# Patient Record
Sex: FEMALE | Race: WHITE | NOT HISPANIC OR LATINO | Employment: UNEMPLOYED | URBAN - METROPOLITAN AREA
[De-identification: names, ages, dates, MRNs, and addresses within clinical notes are randomized per-mention and may not be internally consistent; named-entity substitution may affect disease eponyms.]

---

## 2017-02-06 ENCOUNTER — OFFICE VISIT (OUTPATIENT)
Dept: FAMILY MEDICINE CLINIC | Facility: CLINIC | Age: 32
End: 2017-02-06

## 2017-02-06 VITALS
DIASTOLIC BLOOD PRESSURE: 70 MMHG | SYSTOLIC BLOOD PRESSURE: 130 MMHG | BODY MASS INDEX: 39.55 KG/M2 | WEIGHT: 252 LBS | HEIGHT: 67 IN | HEART RATE: 101 BPM | OXYGEN SATURATION: 99 %

## 2017-02-06 DIAGNOSIS — Z72.0 TOBACCO ABUSE: ICD-10-CM

## 2017-02-06 DIAGNOSIS — Z79.899 HIGH RISK MEDICATION USE: ICD-10-CM

## 2017-02-06 DIAGNOSIS — R05.9 COUGH: Primary | ICD-10-CM

## 2017-02-06 DIAGNOSIS — H91.93 HEARING DIFFICULTY, BILATERAL: ICD-10-CM

## 2017-02-06 DIAGNOSIS — R06.83 SNORING: ICD-10-CM

## 2017-02-06 DIAGNOSIS — J18.9 COMMUNITY ACQUIRED PNEUMONIA: ICD-10-CM

## 2017-02-06 DIAGNOSIS — J45.20 MILD INTERMITTENT ASTHMA WITHOUT COMPLICATION: ICD-10-CM

## 2017-02-06 DIAGNOSIS — H54.7 VISION PROBLEMS: ICD-10-CM

## 2017-02-06 PROBLEM — F25.9 SCHIZOAFFECTIVE DISORDER (HCC): Status: ACTIVE | Noted: 2017-02-06

## 2017-02-06 PROBLEM — J45.909 ASTHMA: Status: ACTIVE | Noted: 2017-02-06

## 2017-02-06 PROBLEM — H91.90 HEARING DIFFICULTY: Status: ACTIVE | Noted: 2017-02-06

## 2017-02-06 PROBLEM — F31.9 BIPOLAR 1 DISORDER (HCC): Status: ACTIVE | Noted: 2017-02-06

## 2017-02-06 PROCEDURE — 99204 OFFICE O/P NEW MOD 45 MIN: CPT | Performed by: NURSE PRACTITIONER

## 2017-02-06 RX ORDER — LORATADINE 10 MG/1
10 TABLET ORAL
COMMUNITY
End: 2022-07-12 | Stop reason: SDUPTHER

## 2017-02-06 RX ORDER — DIVALPROEX SODIUM 125 MG/1
500 TABLET, DELAYED RELEASE ORAL 2 TIMES DAILY
COMMUNITY
End: 2022-07-12 | Stop reason: SDUPTHER

## 2017-02-06 RX ORDER — EPINEPHRINE 0.3 MG/.3ML
0.3 INJECTION SUBCUTANEOUS
COMMUNITY
Start: 2017-01-22

## 2017-02-06 RX ORDER — DIPHENHYDRAMINE HCL 25 MG
25 CAPSULE ORAL EVERY 6 HOURS
COMMUNITY
Start: 2017-01-22

## 2017-02-06 RX ORDER — BUDESONIDE AND FORMOTEROL FUMARATE DIHYDRATE 160; 4.5 UG/1; UG/1
2 AEROSOL RESPIRATORY (INHALATION)
COMMUNITY
End: 2017-02-06

## 2017-02-06 RX ORDER — AZITHROMYCIN 250 MG/1
TABLET, FILM COATED ORAL
Qty: 6 TABLET | Refills: 0 | Status: SHIPPED | OUTPATIENT
Start: 2017-02-06 | End: 2017-03-13

## 2017-02-06 RX ORDER — TRAZODONE HYDROCHLORIDE 50 MG/1
100 TABLET ORAL
COMMUNITY
End: 2022-07-12 | Stop reason: SDUPTHER

## 2017-02-06 NOTE — PATIENT INSTRUCTIONS
Follow-up chest x-ray within the next week or so  Make sure you call back a couple days later for results very important      Outpatient lab results okay, but make sure you receive results  For instructions from me    Follow-up in 4 months sooner for problems    If uncontrolled asthma, return office ASAP  May need further evaluation      For cough congestion Zithromax okay to start tomorrow    But if chest pain shortness of breThank You,      James Epley, [facility]    Recheck or see pulmonary specialist for any persistent cough      Asthma, Adult  Asthma is a condition of the lungs in which the airways tighten and narrow. Asthma can make it hard to breathe. Asthma cannot be cured, but medicine and lifestyle changes can help control it. Asthma may be started (triggered) by:  · Animal skin flakes (dander).  · Dust.  · Cockroaches.  · Pollen.  · Mold.  · Smoke.  · Cleaning products.  · Hair sprays or aerosol sprays.  · Paint fumes or strong smells.  · Cold air, weather changes, and winds.  · Crying or laughing hard.  · Stress.  · Certain medicines or drugs.  · Foods, such as dried fruit, potato chips, and sparkling grape juice.  · Infections or conditions (colds, flu).  · Exercise.  · Certain medical conditions or diseases.  · Exercise or tiring activities.  HOME CARE   · Take medicine as told by your doctor.  · Use a peak flow meter as told by your doctor. A peak flow meter is a tool that measures how well the lungs are working.  · Record and keep track of the peak flow meter's readings.  · Understand and use the asthma action plan. An asthma action plan is a written plan for taking care of your asthma and treating your attacks.  · To help prevent asthma attacks:    Do not smoke. Stay away from secondhand smoke.    Change your heating and air conditioning filter often.    Limit your use of fireplaces and wood stoves.    Get rid of pests (such as roaches and mice) and their droppings.    Throw  away plants if you see mold on them.    Clean your floors. Dust regularly. Use cleaning products that do not smell.    Have someone vacuum when you are not home. Use a vacuum  with a HEPA filter if possible.    Replace carpet with wood, tile, or vinyl gerardo. Carpet can trap animal skin flakes and dust.    Use allergy-proof pillows, mattress covers, and box spring covers.    Wash bed sheets and blankets every week in hot water and dry them in a dryer.    Use blankets that are made of polyester or cotton.    Clean bathrooms and dillon with bleach. If possible, have someone repaint the walls in these rooms with mold-resistant paint. Keep out of the rooms that are being cleaned and painted.    Wash hands often.  GET HELP IF:  · You have make a whistling sound when breaking (wheeze), have shortness of breath, or have a cough even if taking medicine to prevent attacks.  · The colored mucus you cough up (sputum) is thicker than usual.  · The colored mucus you cough up changes from clear or white to yellow, green, gray, or bloody.  · You have problems from the medicine you are taking such as:    A rash.    Itching.    Swelling.    Trouble breathing.  · You need reliever medicines more than 2-3 times a week.  · Your peak flow measurement is still at 50-79% of your personal best after following the action plan for 1 hour.  · You have a fever.  GET HELP RIGHT AWAY IF:   · You seem to be worse and are not responding to medicine during an asthma attack.  · You are short of breath even at rest.  · You get short of breath when doing very little activity.  · You have trouble eating, drinking, or talking.  · You have chest pain.  · You have a fast heartbeat.  · Your lips or fingernails start to turn blue.  · You are light-headed, dizzy, or faint.  · Your peak flow is less than 50% of your personal best.     This information is not intended to replace advice given to you by your health care provider. Make sure you discuss  any questions you have with your health care provider.     Document Released: 06/05/2009 Document Revised: 09/07/2016 Document Reviewed: 07/17/2014  Elsevier Interactive Patient Education ©2016 [employer]

## 2017-02-06 NOTE — PROGRESS NOTES
Patient will get her labs drawn at the Center,  Sydney will fax labs to me, as well as a requested Sharon call back to make sure reviewed the results

## 2017-02-08 NOTE — PROGRESS NOTES
Subjective   Laya Castro is a 31 y.o. female.     HPI Comments: Patient's here  From Center  She was not happy with her previous primary care provider  She was diagnosed with pneumonia in November  casandra better did not get follow-up chest x-ray treated Mac  I reviewed records  Finished antibiotic    Complaining of productive cough green phlegm 3 weeks  Without chest pain or shortness of breath    Difficult historian  Behavior inappropriate  Borderline Rude to myself and Sydney   settled  down after a while and was cooperative    Snores, think she has sleep apnea    History of asthma continues to smoke    Chronic hearing difficulty    Needs glasses vision test             The following portions of the patient's history were reviewed and updated as appropriate: allergies, current medications, past medical history, past social history, past surgical history and problem list.    Review of Systems   Constitutional: Negative for fatigue, fever and unexpected weight change.   HENT: Negative.    Eyes: Positive for visual disturbance.   Respiratory: Positive for wheezing. Negative for shortness of breath.    Cardiovascular: Negative for chest pain.   Gastrointestinal: Negative.    Genitourinary: Negative.    Musculoskeletal: Negative.    Neurological: Negative.    Psychiatric/Behavioral: The patient is nervous/anxious.    All other systems reviewed and are negative.      Objective   Physical Exam   Constitutional: She is oriented to person, place, and time. She appears well-developed and well-nourished.   Without distress obese   HENT:   Head: Normocephalic.   Nose: Nose normal.   Mouth/Throat: Oropharynx is clear and moist.   Tm clear cece no mass canal patent without d/c   Eyes: Conjunctivae are normal. Pupils are equal, round, and reactive to light. No scleral icterus.   Neck: Neck supple. No JVD present. No thyromegaly present.   Cardiovascular: Normal rate, regular rhythm and normal heart sounds.  Exam reveals no  gallop and no friction rub.    No murmur heard.  Pulmonary/Chest: Effort normal. No stridor. No respiratory distress. She has wheezes. She has no rales.   Abdominal: Soft. Bowel sounds are normal. She exhibits no distension. There is no tenderness.   No hepatosplenomegaly, no ascites,   Musculoskeletal: She exhibits no edema or tenderness.   Lymphadenopathy:     She has no cervical adenopathy.   Neurological: She is alert and oriented to person, place, and time. She has normal reflexes.   Skin: Skin is warm and dry. No rash noted. No erythema.   Psychiatric:   Irritable and tense personality talkative  Inappropriate at times with subject matter  Otherwise cooperative   Vitals reviewed.      Assessment/Plan   Laya was seen today for establish care.    Diagnoses and all orders for this visit:    Mild intermittent asthma without complication  -     TSH Rfx On Abnormal To Free T4  -     Comprehensive Metabolic Panel  -     CBC & Differential  -     Lipid Panel With LDL / HDL Ratio  -     Valproic Acid Level, Total    Community acquired pneumonia  Comments:  Fall 2016  Orders:  -     Cancel: XR Chest PA & Lateral  -     XR Chest PA & Lateral  -     TSH Rfx On Abnormal To Free T4  -     Comprehensive Metabolic Panel  -     CBC & Differential  -     Lipid Panel With LDL / HDL Ratio  -     Valproic Acid Level, Total    Tobacco abuse  -     TSH Rfx On Abnormal To Free T4  -     Comprehensive Metabolic Panel  -     CBC & Differential  -     Lipid Panel With LDL / HDL Ratio  -     Valproic Acid Level, Total    Vision problems  -     Ambulatory Referral to Optometry  -     TSH Rfx On Abnormal To Free T4  -     Comprehensive Metabolic Panel  -     CBC & Differential  -     Lipid Panel With LDL / HDL Ratio  -     Valproic Acid Level, Total    Hearing difficulty, bilateral  -     Ambulatory Referral to ENT (Otolaryngology)  -     TSH Rfx On Abnormal To Free T4  -     Comprehensive Metabolic Panel  -     CBC & Differential  -      Lipid Panel With LDL / HDL Ratio  -     Valproic Acid Level, Total    Snoring  -     Ambulatory Referral to Sleep Medicine  -     TSH Rfx On Abnormal To Free T4  -     Comprehensive Metabolic Panel  -     CBC & Differential  -     Lipid Panel With LDL / HDL Ratio  -     Valproic Acid Level, Total    Cough  -     TSH Rfx On Abnormal To Free T4  -     Comprehensive Metabolic Panel  -     CBC & Differential  -     Lipid Panel With LDL / HDL Ratio  -     Valproic Acid Level, Total    High risk medication use  -     TSH Rfx On Abnormal To Free T4  -     Comprehensive Metabolic Panel  -     CBC & Differential  -     Lipid Panel With LDL / HDL Ratio  -     Valproic Acid Level, Total    Other orders  -     azithromycin (ZITHROMAX Z-ERNST) 250 MG tablet; Take 2 tablets the first day, then 1 tablet daily for 4 days.  -     [facility] - IMAGING             e

## 2017-02-20 PROBLEM — E78.2 MIXED HYPERLIPIDEMIA: Status: ACTIVE | Noted: 2017-02-20

## 2017-05-11 ENCOUNTER — APPOINTMENT (OUTPATIENT)
Dept: SLEEP MEDICINE | Facility: HOSPITAL | Age: 32
End: 2017-05-11

## 2017-11-10 ENCOUNTER — OFFICE VISIT (OUTPATIENT)
Dept: ORTHOPEDIC SURGERY | Facility: CLINIC | Age: 32
End: 2017-11-10

## 2017-11-10 VITALS — WEIGHT: 253 LBS | BODY MASS INDEX: 39.71 KG/M2 | HEIGHT: 67 IN | TEMPERATURE: 98.1 F

## 2017-11-10 DIAGNOSIS — M54.32 BILATERAL SCIATICA: ICD-10-CM

## 2017-11-10 DIAGNOSIS — R29.898 WEAKNESS OF BOTH LOWER EXTREMITIES: Primary | ICD-10-CM

## 2017-11-10 DIAGNOSIS — M54.50 LUMBAR SPINE PAIN: Primary | ICD-10-CM

## 2017-11-10 DIAGNOSIS — M54.31 BILATERAL SCIATICA: ICD-10-CM

## 2017-11-10 PROCEDURE — 99204 OFFICE O/P NEW MOD 45 MIN: CPT | Performed by: ORTHOPAEDIC SURGERY

## 2017-11-10 PROCEDURE — 72100 X-RAY EXAM L-S SPINE 2/3 VWS: CPT | Performed by: ORTHOPAEDIC SURGERY

## 2017-11-10 NOTE — PROGRESS NOTES
New patient or new problem visit    Chief Complaint   Patient presents with   • Lumbar Spine - Pain       HPI: She complains of an 8 month history of leg numbness no back pain occasional weakness.  There is some leg pain which she states is intermittent and severe accompanied by aching and burning.  She states sometimes that she wobbles and one spell.  There is no back pain.    PFSH: See chart- reviewed.  She is on multiple psychiatric medications and has diagnoses of schizoaffective disorder and bipolar among others.    Review of Systems   Constitutional: Positive for chills and fever.   HENT: Positive for sneezing.    Eyes: Negative.    Respiratory: Positive for cough.    Cardiovascular: Negative.    Gastrointestinal: Positive for abdominal pain.   Endocrine: Positive for cold intolerance and heat intolerance.   Genitourinary: Negative.    Musculoskeletal: Positive for arthralgias and back pain.   Skin: Negative.    Neurological: Positive for numbness and headaches.   Psychiatric/Behavioral: Positive for sleep disturbance. The patient is nervous/anxious.        PE: Constitutional: Vital signs above-noted.  Awake, alert and oriented    Psychiatric: Affect and insight do not appear grossly disturbed, at least at present.  She has another person with her who I believe is an attendant, although I did not inquire.    Pulmonary: Breathing is unlabored, color is good.    Skin: Warm, dry and normal turgor    Cardiac:  pedal pulses intact.  No edema.    Eyesight and hearing appear adequate for examination purposes      Musculoskeletal:  There is some tenderness to percussion and palpation of the spine. Motion appears undisturbed.  Posture is unremarkable to coronal and sagittal inspection.    The skin about the area is intact.  There is no palpable or visible deformity.  There is no local spasm.       Neurologic:   Reflexes are 2+ and symmetrical in the patellae and untested in the Achilles.   Motor function is undisturbed  in quadriceps, EHL, and gastrocnemius      Sensation appears symmetrically intact to light touch   .  In the bilateral lower extremities there is no evidence of atrophy.   Clonus is absent..  Gait appears undisturbed.  SLR test negative      MEDICAL DECISION MAKING    XRAY: Plain film x-rays of lumbar spine show hypolordotic lumbar posture but look otherwise unremarkable.  There are no comparison views.    Other: n/a    Impression: Not sure what to make of this or how serious the complaints are.  I see no obvious neurologic decline but because of the psychiatric background I think we need to dig  deeper and make sure were not missing out on a lumbar lesion    Plan: I plan MRI scan of the lumbar spine.  I will call her with results.

## 2017-12-04 ENCOUNTER — APPOINTMENT (OUTPATIENT)
Dept: MRI IMAGING | Facility: HOSPITAL | Age: 32
End: 2017-12-04
Attending: ORTHOPAEDIC SURGERY

## 2017-12-18 ENCOUNTER — OFFICE VISIT (OUTPATIENT)
Dept: GASTROENTEROLOGY | Facility: CLINIC | Age: 32
End: 2017-12-18

## 2017-12-18 VITALS
WEIGHT: 252 LBS | SYSTOLIC BLOOD PRESSURE: 130 MMHG | HEIGHT: 68 IN | BODY MASS INDEX: 38.19 KG/M2 | DIASTOLIC BLOOD PRESSURE: 78 MMHG

## 2017-12-18 DIAGNOSIS — R10.84 GENERALIZED ABDOMINAL PAIN: Primary | ICD-10-CM

## 2017-12-18 DIAGNOSIS — K59.00 CONSTIPATION, UNSPECIFIED CONSTIPATION TYPE: ICD-10-CM

## 2017-12-18 PROCEDURE — 99203 OFFICE O/P NEW LOW 30 MIN: CPT | Performed by: INTERNAL MEDICINE

## 2017-12-18 NOTE — PROGRESS NOTES
Chief Complaint   Patient presents with   • Abdominal Pain     Subjective      HPI     Laya Castro is a 32 y.o. female who presents for evaluation of abdominal pain.  Symptoms have been present for last few months.  Pain is generalized.  Pain is constant.  No clear exacerbating factors. Improved with warm compress.  She does have chronic constipation.  Symptoms do improve with BM.  No blood in stool.  No weight loss. She had CT scan performed at [facility] recently which showed mild/moderate stool burden but no other abnormalities.  She tried Linzess for about 4 days but stopped the medications because she did not feel it was working. She does not know which dose she was on.  Pt has significant psych hx with schizoaffective d/o and bipolar.  She is on various antipsychotics and is [place].      Past Medical History:   Diagnosis Date   • Anxiety    • Asthma    • Bipolar 1 disorder    • Bipolar 1 disorder, manic, moderate    • Depression    • Schizoaffective disorder        Current Outpatient Prescriptions:   •  albuterol (PROVENTIL HFA;VENTOLIN HFA) 108 (90 BASE) MCG/ACT inhaler, Inhale 2 puffs every 4 (four) hours as needed for wheezing., Disp: , Rfl:   •  diphenhydrAMINE (BENADRYL) 25 mg capsule, Take 25 mg by mouth Every 6 (Six) Hours., Disp: , Rfl:   •  divalproex (DEPAKOTE) 125 MG DR tablet, Take 500 mg by mouth., Disp: , Rfl:   •  EPINEPHrine (EPIPEN 2-ERNST) 0.3 MG/0.3ML solution auto-injector injection, Inject 0.3 mg into the shoulder, thigh, or buttocks., Disp: , Rfl:   •  loratadine (CLARITIN) 10 MG tablet, Take 10 mg by mouth., Disp: , Rfl:   •  mometasone-formoterol (DULERA 100) 100-5 MCG/ACT inhaler, Inhale 2 puffs 2 (Two) Times a Day., Disp: , Rfl:   •  QUEtiapine XR (SEROquel XR) 400 MG 24 hr tablet, Take 700 mg by mouth Every Night., Disp: , Rfl:   •  traZODone (DESYREL) 50 MG tablet, Take 50 mg by mouth., Disp: , Rfl:   •  linaclotide (LINZESS) 290 MCG capsule capsule,  Take 1 capsule by mouth Every Morning Before Breakfast., Disp: 30 capsule, Rfl: 3     Allergies   Allergen Reactions   • Shellfish-Derived Products Itching and Swelling   • Fish-Derived Products    • Haldol [Haloperidol] Swelling   • Phenergan [Promethazine Hcl]    • Promethazine      Hives and throat swelling    • Shrimp Flavor      Social History     Social History   • Marital status: Single     Spouse name: N/A   • Number of children: N/A   • Years of education: N/A     Occupational History   • Not on file.     Social History Main Topics   • Smoking status: Current Every Day Smoker   • Smokeless tobacco: Never Used   • Alcohol use Yes      Comment: occ.   • Drug use: No   • Sexual activity: Yes     Partners: Male     Other Topics Concern   • Not on file     Social History Narrative     Family History   Problem Relation Age of Onset   • Anxiety disorder Mother    • Depression Mother      Review of Systems   Constitutional: Negative.    HENT: Negative.    Respiratory: Negative.    Gastrointestinal: Positive for abdominal pain and constipation.   Psychiatric/Behavioral: Positive for behavioral problems.   All other systems reviewed and are negative.       Objective   Vitals:    12/18/17 1438   BP: 130/78     Physical Exam   Constitutional: She is oriented to person, place, and time. She appears well-developed and well-nourished.   HENT:   Head: Normocephalic and atraumatic.   Mouth/Throat: Oropharynx is clear and moist.   Eyes: EOM are normal. No scleral icterus.   Neck: Normal range of motion. Neck supple. No thyromegaly present.   Cardiovascular: Normal rate, regular rhythm and normal heart sounds.  Exam reveals no gallop and no friction rub.    No murmur heard.  Pulmonary/Chest: Effort normal and breath sounds normal. She has no wheezes. She has no rales. She exhibits no tenderness.   Abdominal: Soft. Bowel sounds are normal. She exhibits no distension. There is tenderness. There is no rebound and no guarding. No  hernia.   Obese  Bilateral lower abdominal TTP w/o rebound/guarding   Musculoskeletal: Normal range of motion. She exhibits no edema.   Lymphadenopathy:     She has no cervical adenopathy.   Neurological: She is alert and oriented to person, place, and time.   Skin: Skin is warm and dry.   Psychiatric: She has a normal mood and affect. Judgment and thought content normal.   Vitals reviewed.       Assessment/Plan   Assessment:     1. Generalized abdominal pain    2. Constipation, unspecified constipation type      Plan:   Linzess 290mcg/day - advised it can take 4-6 weeks to reach maximal effect  As needed miralax  Increase H20 intake and recommend daily exercise and high fiber diet    Follow up 6 weeks        Gaurav Taylor M.D.  [facility]  [address] [place] [address]  Office: [phone]

## 2018-01-29 ENCOUNTER — APPOINTMENT (OUTPATIENT)
Dept: MRI IMAGING | Facility: HOSPITAL | Age: 33
End: 2018-01-29
Attending: ORTHOPAEDIC SURGERY

## 2018-01-29 ENCOUNTER — OFFICE VISIT (OUTPATIENT)
Dept: GASTROENTEROLOGY | Facility: CLINIC | Age: 33
End: 2018-01-29

## 2018-01-29 VITALS
BODY MASS INDEX: 38.34 KG/M2 | TEMPERATURE: 99.1 F | DIASTOLIC BLOOD PRESSURE: 80 MMHG | SYSTOLIC BLOOD PRESSURE: 116 MMHG | HEIGHT: 68 IN | WEIGHT: 253 LBS

## 2018-01-29 DIAGNOSIS — R10.84 GENERALIZED ABDOMINAL PAIN: Primary | ICD-10-CM

## 2018-01-29 PROCEDURE — 99214 OFFICE O/P EST MOD 30 MIN: CPT | Performed by: NURSE PRACTITIONER

## 2018-01-29 RX ORDER — NAPROXEN 500 MG/1
500 TABLET ORAL
COMMUNITY
Start: 2018-01-22 | End: 2018-08-14

## 2018-01-29 RX ORDER — METAXALONE 800 MG/1
800 TABLET ORAL
COMMUNITY
Start: 2018-01-22 | End: 2018-08-14

## 2018-01-29 RX ORDER — QUETIAPINE FUMARATE 100 MG/1
100 TABLET, FILM COATED ORAL 2 TIMES DAILY WITH MEALS
COMMUNITY
End: 2022-07-12 | Stop reason: SDUPTHER

## 2018-01-29 RX ORDER — ATORVASTATIN CALCIUM 20 MG/1
20 TABLET, FILM COATED ORAL
Refills: 0 | COMMUNITY
Start: 2017-11-01 | End: 2022-07-27

## 2018-01-29 RX ORDER — METHOCARBAMOL 750 MG/1
TABLET ORAL
Refills: 3 | COMMUNITY
Start: 2017-11-01 | End: 2022-07-08

## 2018-01-29 RX ORDER — CYCLOBENZAPRINE HCL 10 MG
10 TABLET ORAL 3 TIMES DAILY
Refills: 0 | COMMUNITY
Start: 2017-12-10

## 2018-01-29 RX ORDER — CHLORZOXAZONE 500 MG/1
500 TABLET ORAL 3 TIMES DAILY
Refills: 0 | COMMUNITY
Start: 2018-01-22 | End: 2018-08-14

## 2018-01-29 RX ORDER — PREDNISONE 10 MG/1
1 TABLET ORAL
COMMUNITY
Start: 2018-01-24 | End: 2018-08-14

## 2018-01-29 NOTE — PROGRESS NOTES
Chief Complaint   Patient presents with   • Abdominal Pain     HPI    32-year-old female patient of Dr. Taylor's last evaluated the office in December 2017.  At that time she presented for evaluation of generalized abdominal pain that had been present for a period of several months prior to her office appointment.  She described the pain as being generalized, constant with no exacerbating factors, it improved with warm compresses.  She gave a history of chronic constipation with a prior KUB having been done in October that showed a significant stool burden.  There were otherwise no abnormalities on imaging.  She was trialed on Linzess but stopped the medications after only a few days as she did not feel as if it was working, she was unsure which dose she was taking.  She has a significant psych history with schizoaffective disorder and bipolar disorder, she is on various antipsychotics, she is followed closely at [address] [place] [address], her nurse actually accompanies her today's visit.  At the time of her last office visit she was reporting a bowel movement every couple of days with significant straining in order to eliminate.  She denied any blood in her stool.  She was placed on Linzess 290 mcg with instructions to use MiraLAX over-the-counter as needed as well as to increase her water intake and she presents today for follow-up.    2 days after her office visit with Dr. Taylor on December 18 she presented to [facility] with complaints of abdominal pain.  A CT scan was performed of the abdomen that has been reviewed in care everywhere.  She had no acute findings per radiology report and there was no mention of significant constipation.  Patient reports that she has been compliant with the daily Linzess.  She is still having a bowel movement only every couple of days which is unchanged with the initiation of the medication although she does report she is not having to strain nearly as much to eliminate.  She  continues with generalized abdominal pain.  It is diffuse in nature, it is not associated with nausea, vomiting, fever or chills.    Past Medical History:   Diagnosis Date   • Anxiety    • Asthma    • Bipolar 1 disorder    • Bipolar 1 disorder, manic, moderate    • Depression    • Schizoaffective disorder      Past Surgical History:   Procedure Laterality Date   • ENDOSCOPY  07/2016    Dr. Blanton @ [facility]   • GALLBLADDER SURGERY         Current Outpatient Prescriptions   Medication Sig Dispense Refill   • albuterol (PROVENTIL HFA;VENTOLIN HFA) 108 (90 BASE) MCG/ACT inhaler Inhale 2 puffs every 4 (four) hours as needed for wheezing.     • atorvastatin (LIPITOR) 20 MG tablet Take 20 mg by mouth every night at bedtime.  0   • chlorzoxazone (PARAFON FORTE) 500 MG tablet Take 500 mg by mouth 3 (Three) Times a Day.  0   • cyclobenzaprine (FLEXERIL) 10 MG tablet Take 10 mg by mouth 3 (Three) Times a Day.  0   • D3-50 96806 units capsule TK 1 C PO ONCE WEEKLY  3   • diphenhydrAMINE (BENADRYL) 25 mg capsule Take 25 mg by mouth Every 6 (Six) Hours.     • divalproex (DEPAKOTE) 125 MG DR tablet Take 500 mg by mouth 2 (Two) Times a Day.     • EPINEPHrine (EPIPEN 2-ERNST) 0.3 MG/0.3ML solution auto-injector injection Inject 0.3 mg into the shoulder, thigh, or buttocks.     • linaclotide (LINZESS) 290 MCG capsule capsule Take 1 capsule by mouth Every Morning Before Breakfast. 30 capsule 3   • loratadine (CLARITIN) 10 MG tablet Take 10 mg by mouth.     • metaxalone (SKELAXIN) 800 MG tablet Take 800 mg by mouth.     • mometasone-formoterol (DULERA 100) 100-5 MCG/ACT inhaler Inhale 2 puffs 2 (Two) Times a Day.     • naproxen (NAPROSYN) 500 MG tablet Take 500 mg by mouth.     • PredniSONE (DELTASONE) 10 MG (21) tablet pack Take 1 packet by mouth.     • QUEtiapine (SEROquel) 100 MG tablet Take 100 mg by mouth 2 (Two) Times a Day With Meals.     • QUEtiapine XR (SEROquel XR) 400 MG 24 hr tablet Take 700 mg by mouth Every Night.     •  "traZODone (DESYREL) 50 MG tablet Take 100 mg by mouth.       No current facility-administered medications for this visit.        PRN Meds:.    Allergies   Allergen Reactions   • Shellfish-Derived Products Itching and Swelling   • Fish-Derived Products    • Haldol [Haloperidol] Swelling   • Phenergan [Promethazine Hcl]    • Promethazine      Hives and throat swelling    • Shrimp Flavor        Social History     Social History   • Marital status: Single     Spouse name: N/A   • Number of children: N/A   • Years of education: N/A     Occupational History   • Not on file.     Social History Main Topics   • Smoking status: Current Every Day Smoker     Packs/day: 1.00     Types: Cigarettes   • Smokeless tobacco: Never Used   • Alcohol use Yes      Comment: occ.   • Drug use: No   • Sexual activity: Yes     Partners: Male     Other Topics Concern   • Not on file     Social History Narrative       Family History   Problem Relation Age of Onset   • Anxiety disorder Mother    • Depression Mother        Review of Systems   Constitutional: Negative for activity change, appetite change and unexpected weight change.   Gastrointestinal: Positive for abdominal distention, abdominal pain and constipation. Negative for blood in stool, diarrhea, nausea and vomiting.   Allergic/Immunologic: Negative for immunocompromised state.       Vitals:    01/29/18 1449   BP: 116/80   Temp: 99.1 °F (37.3 °C)     /80  Temp 99.1 °F (37.3 °C)  Ht 171.5 cm (67.5\")  Wt 115 kg (253 lb)  BMI 39.04 kg/m2  Physical Exam   Constitutional: She is oriented to person, place, and time. She appears well-developed and well-nourished. No distress.   obese   HENT:   Head: Normocephalic and atraumatic.   Eyes: No scleral icterus.   Cardiovascular: Normal rate and regular rhythm.    Pulmonary/Chest: Effort normal and breath sounds normal.   Abdominal: Soft. Bowel sounds are normal. She exhibits no distension. There is no tenderness. There is no rebound and " no guarding.   obese   Neurological: She is alert and oriented to person, place, and time.   Skin: Skin is warm and dry.   Psychiatric: She has a normal mood and affect.   Vitals reviewed.      ASSESSMENT AND PLAN    Laya was seen today for abdominal pain.    Diagnoses and all orders for this visit:    Generalized abdominal pain  Comments:  chronic in nature.  CT 12/20/17 normal.  Not improved with treatment of constipation  Orders:  -     Case Request; Standing  -     Case Request    Other orders  -     Obtain informed consent; Standing  -     Verify bowel prep was successful; Standing  -     Give tap water enema if bowel prep was insufficient; Standing  -     Give Fleet's enema for intolerance of bowel prep; Standing    CT scan at [facility] on December 20 with no acute findings.  Constipation perhaps minimally improved with use of Linzess, she will continue this.  We will schedule her for a colonoscopy as she has never had endoscopic evaluation for her complaints of ongoing abdominal pain.  I have discussed with both the patient and the nurse accompanying her today that she should begin a clear liquid diet at least 24 hours prior to starting the bowel prep to ensure good visualization at the time of the endoscopy.  Further follow-up pending outcome of her endoscopy findings        MARNI Perez   [facility]  [address] [place] [address]  Office: [phone]

## 2018-02-27 ENCOUNTER — HOSPITAL ENCOUNTER (OUTPATIENT)
Facility: HOSPITAL | Age: 33
Setting detail: HOSPITAL OUTPATIENT SURGERY
Discharge: HOME | End: 2018-02-27
Attending: INTERNAL MEDICINE | Admitting: INTERNAL MEDICINE

## 2018-02-27 ENCOUNTER — ANESTHESIA EVENT (OUTPATIENT)
Dept: GASTROENTEROLOGY | Facility: HOSPITAL | Age: 33
End: 2018-02-27

## 2018-02-27 ENCOUNTER — ANESTHESIA (OUTPATIENT)
Dept: GASTROENTEROLOGY | Facility: HOSPITAL | Age: 33
End: 2018-02-27

## 2018-02-27 VITALS
WEIGHT: 252 LBS | RESPIRATION RATE: 16 BRPM | TEMPERATURE: 97.5 F | SYSTOLIC BLOOD PRESSURE: 113 MMHG | HEART RATE: 84 BPM | OXYGEN SATURATION: 96 % | BODY MASS INDEX: 39.55 KG/M2 | DIASTOLIC BLOOD PRESSURE: 79 MMHG | HEIGHT: 67 IN

## 2018-02-27 DIAGNOSIS — R10.84 GENERALIZED ABDOMINAL PAIN: ICD-10-CM

## 2018-02-27 LAB
B-HCG UR QL: NEGATIVE
INTERNAL NEGATIVE CONTROL: NEGATIVE
INTERNAL POSITIVE CONTROL: POSITIVE
Lab: NORMAL

## 2018-02-27 PROCEDURE — 25010000002 PROPOFOL 10 MG/ML EMULSION: Performed by: ANESTHESIOLOGY

## 2018-02-27 PROCEDURE — 45380 COLONOSCOPY AND BIOPSY: CPT | Performed by: INTERNAL MEDICINE

## 2018-02-27 PROCEDURE — 88305 TISSUE EXAM BY PATHOLOGIST: CPT | Performed by: INTERNAL MEDICINE

## 2018-02-27 RX ORDER — SODIUM CHLORIDE, SODIUM LACTATE, POTASSIUM CHLORIDE, CALCIUM CHLORIDE 600; 310; 30; 20 MG/100ML; MG/100ML; MG/100ML; MG/100ML
1000 INJECTION, SOLUTION INTRAVENOUS CONTINUOUS PRN
Status: DISCONTINUED | OUTPATIENT
Start: 2018-02-27 | End: 2018-02-27 | Stop reason: HOSPADM

## 2018-02-27 RX ORDER — PROPOFOL 10 MG/ML
VIAL (ML) INTRAVENOUS AS NEEDED
Status: DISCONTINUED | OUTPATIENT
Start: 2018-02-27 | End: 2018-02-27 | Stop reason: SURG

## 2018-02-27 RX ORDER — LIDOCAINE HYDROCHLORIDE 10 MG/ML
0.5 INJECTION, SOLUTION INFILTRATION; PERINEURAL ONCE AS NEEDED
Status: DISCONTINUED | OUTPATIENT
Start: 2018-02-27 | End: 2018-02-27 | Stop reason: HOSPADM

## 2018-02-27 RX ORDER — SODIUM CHLORIDE 0.9 % (FLUSH) 0.9 %
3 SYRINGE (ML) INJECTION AS NEEDED
Status: DISCONTINUED | OUTPATIENT
Start: 2018-02-27 | End: 2018-02-27 | Stop reason: HOSPADM

## 2018-02-27 RX ORDER — LIDOCAINE HYDROCHLORIDE 20 MG/ML
INJECTION, SOLUTION INFILTRATION; PERINEURAL AS NEEDED
Status: DISCONTINUED | OUTPATIENT
Start: 2018-02-27 | End: 2018-02-27 | Stop reason: SURG

## 2018-02-27 RX ADMIN — LIDOCAINE HYDROCHLORIDE 100 MG: 20 INJECTION, SOLUTION INFILTRATION; PERINEURAL at 10:41

## 2018-02-27 RX ADMIN — PROPOFOL 200 MG: 10 INJECTION, EMULSION INTRAVENOUS at 10:41

## 2018-02-27 RX ADMIN — SODIUM CHLORIDE, POTASSIUM CHLORIDE, SODIUM LACTATE AND CALCIUM CHLORIDE 1000 ML: 600; 310; 30; 20 INJECTION, SOLUTION INTRAVENOUS at 10:29

## 2018-02-27 NOTE — ANESTHESIA POSTPROCEDURE EVALUATION
"Patient: Laya Castro    Procedure Summary     Date Anesthesia Start Anesthesia Stop Room / Location    02/27/18 1038 1054  [facility] ENDOSCOPY 6 /  [facility] ENDOSCOPY       Procedure Diagnosis Surgeon Provider    COLONOSCOPY TO THE CECUM AND TI WITH COLD BX'S (N/A ) Generalized abdominal pain  (Generalized abdominal pain [R10.84]) MD Patito Tomlin MD          Anesthesia Type: MAC  Last vitals  BP   113/79 (02/27/18 1121)   Temp   36.4 °C (97.5 °F) (02/27/18 1104)   Pulse   84 (02/27/18 1121)   Resp   16 (02/27/18 1121)     SpO2   96 % (02/27/18 1121)     Post Anesthesia Care and Evaluation    Patient location during evaluation: PACU  Patient participation: complete - patient participated  Level of consciousness: awake  Pain score: 0  Pain management: adequate  Airway patency: patent  Anesthetic complications: No anesthetic complications    Cardiovascular status: acceptable  Respiratory status: acceptable  Hydration status: acceptable    Comments: Blood pressure 113/79, pulse 84, temperature 36.4 °C (97.5 °F), temperature source Oral, resp. rate 16, height 170.2 cm (67\"), weight 114 kg (252 lb), SpO2 96 %, not currently breastfeeding.    No anesthesia care post op    "

## 2018-02-27 NOTE — ANESTHESIA PREPROCEDURE EVALUATION
Anesthesia Evaluation     Patient summary reviewed and Nursing notes reviewed   NPO Solid Status: > 8 hours  NPO Liquid Status: > 4 hours           Airway   Mallampati: II  TM distance: >3 FB  Neck ROM: full  possible difficult intubation  Dental - normal exam   (+) edentulous    Pulmonary - normal exam   (+) pneumonia ,   Cardiovascular - normal exam        Neuro/Psych  (+) psychiatric history,     GI/Hepatic/Renal/Endo    (+) obesity,       Musculoskeletal     Abdominal  - normal exam    Bowel sounds: normal.   Substance History      OB/GYN          Other                        Anesthesia Plan    ASA 3     MAC     Anesthetic plan and risks discussed with patient.

## 2018-02-28 LAB
LAB AP CASE REPORT: NORMAL
Lab: NORMAL
PATH REPORT.FINAL DX SPEC: NORMAL
PATH REPORT.GROSS SPEC: NORMAL

## 2018-04-04 ENCOUNTER — TELEPHONE (OUTPATIENT)
Dept: GASTROENTEROLOGY | Facility: CLINIC | Age: 33
End: 2018-04-04

## 2018-04-04 NOTE — TELEPHONE ENCOUNTER
Called pt and advised that per Dr Taylor: the biopsy from the terminal ileum (small bowel attached to the colon) was normal and MD recommends for her to follow up in the office as needed. Pt verb understanding.

## 2018-04-04 NOTE — TELEPHONE ENCOUNTER
----- Message from Gaurav Taylor MD sent at 3/12/2018 12:34 PM EDT -----  Normal TI bx  F/u office as needed

## 2018-04-25 ENCOUNTER — TRANSCRIBE ORDERS (OUTPATIENT)
Dept: ADMINISTRATIVE | Facility: HOSPITAL | Age: 33
End: 2018-04-25

## 2018-04-25 DIAGNOSIS — R07.9 CHEST PAIN, UNSPECIFIED TYPE: Primary | ICD-10-CM

## 2018-04-25 DIAGNOSIS — M79.89 LEG SWELLING: ICD-10-CM

## 2018-04-25 DIAGNOSIS — R06.02 SOB (SHORTNESS OF BREATH): ICD-10-CM

## 2018-05-07 ENCOUNTER — APPOINTMENT (OUTPATIENT)
Dept: CARDIOLOGY | Facility: HOSPITAL | Age: 33
End: 2018-05-07

## 2018-05-24 ENCOUNTER — HOSPITAL ENCOUNTER (OUTPATIENT)
Dept: CARDIOLOGY | Facility: HOSPITAL | Age: 33
Discharge: HOME | End: 2018-05-24
Admitting: NURSE PRACTITIONER

## 2018-05-24 VITALS
BODY MASS INDEX: 39.55 KG/M2 | HEART RATE: 100 BPM | HEIGHT: 67 IN | WEIGHT: 252 LBS | DIASTOLIC BLOOD PRESSURE: 76 MMHG | SYSTOLIC BLOOD PRESSURE: 126 MMHG

## 2018-05-24 DIAGNOSIS — R07.9 CHEST PAIN, UNSPECIFIED TYPE: ICD-10-CM

## 2018-05-24 DIAGNOSIS — R06.02 SOB (SHORTNESS OF BREATH): ICD-10-CM

## 2018-05-24 DIAGNOSIS — M79.89 LEG SWELLING: ICD-10-CM

## 2018-05-24 LAB
ASCENDING AORTA: 3 CM
BH CV ECHO MEAS - ACS: 1.8 CM
BH CV ECHO MEAS - AO MAX PG (FULL): 6.9 MMHG
BH CV ECHO MEAS - AO MAX PG: 10.6 MMHG
BH CV ECHO MEAS - AO MEAN PG (FULL): 1.5 MMHG
BH CV ECHO MEAS - AO MEAN PG: 4.1 MMHG
BH CV ECHO MEAS - AO ROOT AREA (BSA CORRECTED): 1.4
BH CV ECHO MEAS - AO ROOT AREA: 7.5 CM^2
BH CV ECHO MEAS - AO ROOT DIAM: 3.1 CM
BH CV ECHO MEAS - AO V2 MAX: 162.9 CM/SEC
BH CV ECHO MEAS - AO V2 MEAN: 84.7 CM/SEC
BH CV ECHO MEAS - AO V2 VTI: 26.8 CM
BH CV ECHO MEAS - ASC AORTA: 3 CM
BH CV ECHO MEAS - AVA(I,A): 2.1 CM^2
BH CV ECHO MEAS - AVA(I,D): 2.1 CM^2
BH CV ECHO MEAS - AVA(V,A): 2 CM^2
BH CV ECHO MEAS - AVA(V,D): 2 CM^2
BH CV ECHO MEAS - BSA(HAYCOCK): 2.4 M^2
BH CV ECHO MEAS - BSA: 2.2 M^2
BH CV ECHO MEAS - BZI_BMI: 39.5 KILOGRAMS/M^2
BH CV ECHO MEAS - BZI_METRIC_HEIGHT: 170.2 CM
BH CV ECHO MEAS - BZI_METRIC_WEIGHT: 114.3 KG
BH CV ECHO MEAS - CONTRAST EF (2CH): 60 ML/M^2
BH CV ECHO MEAS - CONTRAST EF 4CH: 57.1 ML/M^2
BH CV ECHO MEAS - EDV(MOD-SP2): 65 ML
BH CV ECHO MEAS - EDV(MOD-SP4): 70 ML
BH CV ECHO MEAS - EDV(TEICH): 115.8 ML
BH CV ECHO MEAS - EF(CUBED): 62.5 %
BH CV ECHO MEAS - EF(MOD-SP2): 60 %
BH CV ECHO MEAS - EF(MOD-SP4): 57.1 %
BH CV ECHO MEAS - EF(TEICH): 53.9 %
BH CV ECHO MEAS - ESV(MOD-SP2): 26 ML
BH CV ECHO MEAS - ESV(MOD-SP4): 30 ML
BH CV ECHO MEAS - ESV(TEICH): 53.4 ML
BH CV ECHO MEAS - FS: 27.9 %
BH CV ECHO MEAS - IVS/LVPW: 1.4
BH CV ECHO MEAS - IVSD: 1 CM
BH CV ECHO MEAS - LAT PEAK E' VEL: 12 CM/SEC
BH CV ECHO MEAS - LV DIASTOLIC VOL/BSA (35-75): 31.4 ML/M^2
BH CV ECHO MEAS - LV MASS(C)D: 147.8 GRAMS
BH CV ECHO MEAS - LV MASS(C)DI: 66.2 GRAMS/M^2
BH CV ECHO MEAS - LV MAX PG: 3.7 MMHG
BH CV ECHO MEAS - LV MEAN PG: 2.6 MMHG
BH CV ECHO MEAS - LV SYSTOLIC VOL/BSA (12-30): 13.4 ML/M^2
BH CV ECHO MEAS - LV V1 MAX: 96 CM/SEC
BH CV ECHO MEAS - LV V1 MEAN: 73.8 CM/SEC
BH CV ECHO MEAS - LV V1 VTI: 17.1 CM
BH CV ECHO MEAS - LVIDD: 5 CM
BH CV ECHO MEAS - LVIDS: 3.6 CM
BH CV ECHO MEAS - LVLD AP2: 7.5 CM
BH CV ECHO MEAS - LVLD AP4: 7.3 CM
BH CV ECHO MEAS - LVLS AP2: 6.9 CM
BH CV ECHO MEAS - LVLS AP4: 6 CM
BH CV ECHO MEAS - LVOT AREA (M): 3.5 CM^2
BH CV ECHO MEAS - LVOT AREA: 3.3 CM^2
BH CV ECHO MEAS - LVOT DIAM: 2.1 CM
BH CV ECHO MEAS - LVPWD: 0.73 CM
BH CV ECHO MEAS - MED PEAK E' VEL: 11 CM/SEC
BH CV ECHO MEAS - MV A DUR: 0.11 SEC
BH CV ECHO MEAS - MV A MAX VEL: 60.6 CM/SEC
BH CV ECHO MEAS - MV DEC SLOPE: 277.5 CM/SEC^2
BH CV ECHO MEAS - MV DEC TIME: 0.24 SEC
BH CV ECHO MEAS - MV E MAX VEL: 73.2 CM/SEC
BH CV ECHO MEAS - MV E/A: 1.2
BH CV ECHO MEAS - MV MAX PG: 3.9 MMHG
BH CV ECHO MEAS - MV MEAN PG: 2 MMHG
BH CV ECHO MEAS - MV P1/2T MAX VEL: 73.2 CM/SEC
BH CV ECHO MEAS - MV P1/2T: 77.3 MSEC
BH CV ECHO MEAS - MV V2 MAX: 99.1 CM/SEC
BH CV ECHO MEAS - MV V2 MEAN: 65.6 CM/SEC
BH CV ECHO MEAS - MV V2 VTI: 20.8 CM
BH CV ECHO MEAS - MVA P1/2T LCG: 3 CM^2
BH CV ECHO MEAS - MVA(P1/2T): 2.8 CM^2
BH CV ECHO MEAS - MVA(VTI): 2.7 CM^2
BH CV ECHO MEAS - PA ACC TIME: 0.11 SEC
BH CV ECHO MEAS - PA MAX PG (FULL): 2.7 MMHG
BH CV ECHO MEAS - PA MAX PG: 5.5 MMHG
BH CV ECHO MEAS - PA PR(ACCEL): 31.5 MMHG
BH CV ECHO MEAS - PA V2 MAX: 116.7 CM/SEC
BH CV ECHO MEAS - PULM A REVS DUR: 0.1 SEC
BH CV ECHO MEAS - PULM A REVS VEL: 24.2 CM/SEC
BH CV ECHO MEAS - PULM DIAS VEL: 59.3 CM/SEC
BH CV ECHO MEAS - PULM S/D: 0.68
BH CV ECHO MEAS - PULM SYS VEL: 40 CM/SEC
BH CV ECHO MEAS - PVA(V,A): 1.8 CM^2
BH CV ECHO MEAS - PVA(V,D): 1.8 CM^2
BH CV ECHO MEAS - QP/QS: 0.67
BH CV ECHO MEAS - RV MAX PG: 2.7 MMHG
BH CV ECHO MEAS - RV MEAN PG: 1.6 MMHG
BH CV ECHO MEAS - RV V1 MAX: 82.5 CM/SEC
BH CV ECHO MEAS - RV V1 MEAN: 60.6 CM/SEC
BH CV ECHO MEAS - RV V1 VTI: 15.4 CM
BH CV ECHO MEAS - RVOT AREA: 2.5 CM^2
BH CV ECHO MEAS - RVOT DIAM: 1.8 CM
BH CV ECHO MEAS - SI(AO): 90.4 ML/M^2
BH CV ECHO MEAS - SI(CUBED): 34.1 ML/M^2
BH CV ECHO MEAS - SI(LVOT): 25.5 ML/M^2
BH CV ECHO MEAS - SI(MOD-SP2): 17.5 ML/M^2
BH CV ECHO MEAS - SI(MOD-SP4): 17.9 ML/M^2
BH CV ECHO MEAS - SI(TEICH): 28 ML/M^2
BH CV ECHO MEAS - SUP REN AO DIAM: 1.7 CM
BH CV ECHO MEAS - SV(AO): 201.7 ML
BH CV ECHO MEAS - SV(CUBED): 76.1 ML
BH CV ECHO MEAS - SV(LVOT): 57 ML
BH CV ECHO MEAS - SV(MOD-SP2): 39 ML
BH CV ECHO MEAS - SV(MOD-SP4): 40 ML
BH CV ECHO MEAS - SV(RVOT): 38.2 ML
BH CV ECHO MEAS - SV(TEICH): 62.4 ML
BH CV ECHO MEAS - TAPSE (>1.6): 2.9 CM2
BH CV ECHO MEASUREMENTS AVERAGE E/E' RATIO: 6.37
BH CV XLRA - RV BASE: 2.9 CM
BH CV XLRA - TDI S': 10 CM/SEC
LEFT ATRIUM VOLUME INDEX: 9 ML/M2
LV EF 2D ECHO EST: 57 %
SINUS: 2.9 CM
STJ: 2.5 CM

## 2018-05-24 PROCEDURE — 93306 TTE W/DOPPLER COMPLETE: CPT | Performed by: INTERNAL MEDICINE

## 2018-05-24 PROCEDURE — 93306 TTE W/DOPPLER COMPLETE: CPT

## 2018-08-14 ENCOUNTER — APPOINTMENT (OUTPATIENT)
Dept: GENERAL RADIOLOGY | Facility: HOSPITAL | Age: 33
End: 2018-08-14

## 2018-08-14 ENCOUNTER — HOSPITAL ENCOUNTER (EMERGENCY)
Facility: HOSPITAL | Age: 33
Discharge: HOME | End: 2018-08-14
Attending: EMERGENCY MEDICINE | Admitting: EMERGENCY MEDICINE

## 2018-08-14 VITALS
TEMPERATURE: 99.2 F | BODY MASS INDEX: 41.12 KG/M2 | DIASTOLIC BLOOD PRESSURE: 68 MMHG | SYSTOLIC BLOOD PRESSURE: 113 MMHG | HEART RATE: 115 BPM | WEIGHT: 262 LBS | RESPIRATION RATE: 18 BRPM | HEIGHT: 67 IN | OXYGEN SATURATION: 96 %

## 2018-08-14 DIAGNOSIS — T07.XXXA MULTIPLE CONTUSIONS: Primary | ICD-10-CM

## 2018-08-14 PROCEDURE — 99283 EMERGENCY DEPT VISIT LOW MDM: CPT

## 2018-08-14 PROCEDURE — 73070 X-RAY EXAM OF ELBOW: CPT

## 2018-08-14 PROCEDURE — 73030 X-RAY EXAM OF SHOULDER: CPT

## 2018-08-14 PROCEDURE — 73610 X-RAY EXAM OF ANKLE: CPT

## 2018-08-14 RX ORDER — HYDROCODONE BITARTRATE AND ACETAMINOPHEN 7.5; 325 MG/1; MG/1
1 TABLET ORAL ONCE
Status: COMPLETED | OUTPATIENT
Start: 2018-08-14 | End: 2018-08-14

## 2018-08-14 RX ORDER — MELOXICAM 15 MG/1
15 TABLET ORAL DAILY
COMMUNITY
End: 2022-07-27

## 2018-08-14 RX ORDER — ACETAMINOPHEN AND CODEINE PHOSPHATE 300; 30 MG/1; MG/1
1-2 TABLET ORAL EVERY 6 HOURS PRN
Qty: 20 TABLET | Refills: 0 | OUTPATIENT
Start: 2018-08-14 | End: 2022-07-08

## 2018-08-14 RX ADMIN — HYDROCODONE BITARTRATE AND ACETAMINOPHEN 1 TABLET: 7.5; 325 TABLET ORAL at 03:47

## 2018-08-14 NOTE — ED PROVIDER NOTES
EMERGENCY DEPARTMENT ENCOUNTER    CHIEF COMPLAINT  Chief Complaint: Arm and ankle pain  History given by: Patient  History limited by: None  Room Number: 11/11  PMD: Naheed Gibbons APRN      HPI:  Pt is a 33 y.o. female who presents complaining of R arm and R ankle pain secondary to fall that occurred a few hours ago.    Duration: Occurred a few hours ago  Onset: Gradual  Timing: Constant  Location: R neo R ankle  Intensity/Severity: Moderate  Progression: Unchanged  Associated Symptoms: None  Previous Episodes: None  Treatment before arrival: None    PAST MEDICAL HISTORY  Active Ambulatory Problems     Diagnosis Date Noted   • Asthma 02/06/2017   • Schizoaffective disorder (CMS/HCC) 02/06/2017   • Bipolar 1 disorder (CMS/HCC) 02/06/2017   • Community acquired pneumonia 02/06/2017   • Tobacco abuse 02/06/2017   • Snoring 02/06/2017   • Hearing difficulty 02/06/2017   • Mixed hyperlipidemia 02/20/2017   • Swallowed foreign body 07/21/2016   • Leukopenia 11/12/2016   • Adult body mass index greater than 30 11/12/2016   • Systemic infection (CMS/HCC) 11/12/2016   • Generalized abdominal pain 01/29/2018     Resolved Ambulatory Problems     Diagnosis Date Noted   • No Resolved Ambulatory Problems     Past Medical History:   Diagnosis Date   • Anxiety    • Asthma    • Bipolar 1 disorder (CMS/HCC)    • Bipolar 1 disorder, manic, moderate (CMS/HCC)    • Depression    • Schizoaffective disorder (CMS/HCC)        PAST SURGICAL HISTORY  Past Surgical History:   Procedure Laterality Date   • COLONOSCOPY N/A 2/27/2018    Procedure: COLONOSCOPY TO THE CECUM AND TI WITH COLD BX'S;  Surgeon: Gaurav Taylor MD;  Location: [facility] ENDOSCOPY;  Service:    • ENDOSCOPY  07/2016    Dr. Blanton @ [facility]   • GALLBLADDER SURGERY         FAMILY HISTORY  Family History   Problem Relation Age of Onset   • Anxiety disorder Mother    • Depression Mother        SOCIAL HISTORY  Social History     Social History   • Marital  status: Single     Spouse name: N/A   • Number of children: N/A   • Years of education: N/A     Occupational History   • Not on file.     Social History Main Topics   • Smoking status: Current Every Day Smoker     Packs/day: 1.00     Types: Cigarettes   • Smokeless tobacco: Never Used   • Alcohol use Yes      Comment: occ.   • Drug use: Yes      Comment: smokes weed   • Sexual activity: Yes     Partners: Male     Other Topics Concern   • Not on file     Social History Narrative   • No narrative on file       ALLERGIES  Shellfish-derived products; Fish-derived products; Haldol [haloperidol]; Phenergan [promethazine hcl]; Promethazine; and Shrimp flavor    REVIEW OF SYSTEMS  Review of Systems   Constitutional: Negative for fever.   HENT: Negative for sore throat.    Eyes: Negative.    Respiratory: Negative for cough and shortness of breath.    Cardiovascular: Negative for chest pain.   Gastrointestinal: Negative for abdominal pain, diarrhea and vomiting.   Genitourinary: Negative for dysuria.   Musculoskeletal: Positive for myalgias (R arm and R ankle secondary to fall). Negative for neck pain.   Skin: Negative for rash.   Allergic/Immunologic: Negative.    Neurological: Negative for dizziness, weakness, numbness and headaches.   Hematological: Negative.    Psychiatric/Behavioral: Negative.    All other systems reviewed and are negative.      PHYSICAL EXAM  ED Triage Vitals   Temp Heart Rate Resp BP SpO2   -- 08/14/18 0211 08/14/18 0210 -- 08/14/18 0210    (!) 130 18  100 %      Temp src Heart Rate Source Patient Position BP Location FiO2 (%)   -- 08/14/18 0211 -- -- --    Monitor          Physical Exam   Constitutional: She is oriented to person, place, and time. No distress.   HENT:   Head: Normocephalic and atraumatic.   Eyes: Pupils are equal, round, and reactive to light. EOM are normal.   Neck: Normal range of motion. Neck supple.   Cardiovascular: Normal rate, regular rhythm and normal heart sounds.     Pulmonary/Chest: Effort normal and breath sounds normal. No respiratory distress.   Abdominal: Soft. There is no tenderness. There is no rebound and no guarding.   Musculoskeletal: Normal range of motion. She exhibits no edema.        Right shoulder: She exhibits tenderness (top of the shoulder extending to the base of the neck, no tenderness to anterior or posterior shoulder capsule, and there is increased pain with ROM of shoulder).        Right elbow: Tenderness (mild and increased pain with ROM) found.        Right ankle: Tenderness. Lateral malleolus tenderness found.   Neurological: She is alert and oriented to person, place, and time. She has normal sensation and normal strength.   Skin: Skin is warm and dry. No rash noted.   Psychiatric: Mood and affect normal.   Nursing note and vitals reviewed.    RADIOLOGY  XR Shoulder 2+ View Right   Preliminary Result   No acute fracture or dislocation.           ----------------------------------------------------------------       RIGHT SHOULDER 2 VIEWS.       HISTORY: Fall, pain.       COMPARISON: No prior studies for comparison.       FINDINGS:   There is no fracture or dislocation. Bony fragment at the distal aspect   of the right clavicle may be an old fracture.       Soft tissue structures are unremarkable.       IMPRESSION:   No acute fracture or dislocation.           ----------------------------------------------------------------       RIGHT ANKLE 3 VIEWS.       HISTORY: Fall, ankle pain.       COMPARISON: No prior studies for comparison.       FINDINGS:   There is no fracture or dislocation.           Mild soft tissue swelling around the ankle joint.       IMPRESSION:   No acute fracture or dislocation.              XR Elbow 2 View Right   Preliminary Result   No acute fracture or dislocation.           ----------------------------------------------------------------       RIGHT SHOULDER 2 VIEWS.       HISTORY: Fall, pain.       COMPARISON: No prior  studies for comparison.       FINDINGS:   There is no fracture or dislocation. Bony fragment at the distal aspect   of the right clavicle may be an old fracture.       Soft tissue structures are unremarkable.       IMPRESSION:   No acute fracture or dislocation.           ----------------------------------------------------------------       RIGHT ANKLE 3 VIEWS.       HISTORY: Fall, ankle pain.       COMPARISON: No prior studies for comparison.       FINDINGS:   There is no fracture or dislocation.           Mild soft tissue swelling around the ankle joint.       IMPRESSION:   No acute fracture or dislocation.              XR Ankle 3+ View Right   Preliminary Result   No acute fracture or dislocation.           ----------------------------------------------------------------       RIGHT SHOULDER 2 VIEWS.       HISTORY: Fall, pain.       COMPARISON: No prior studies for comparison.       FINDINGS:   There is no fracture or dislocation. Bony fragment at the distal aspect   of the right clavicle may be an old fracture.       Soft tissue structures are unremarkable.       IMPRESSION:   No acute fracture or dislocation.           ----------------------------------------------------------------       RIGHT ANKLE 3 VIEWS.       HISTORY: Fall, ankle pain.       COMPARISON: No prior studies for comparison.       FINDINGS:   There is no fracture or dislocation.           Mild soft tissue swelling around the ankle joint.       IMPRESSION:   No acute fracture or dislocation.                   I ordered the above noted radiological studies. Interpreted by radiologist. Reviewed by me in PACS.       PROCEDURES  Procedures      PROGRESS AND CONSULTS   0233 Ordered XR R ankle, shoulder, and elbow for further evaluation.    0339 Rechecked with pt, who is resting comfortably, and discussed no fractures or dislocations seen in XR's. Plan to discharge with sling for R arm and pain medication. Pt can use cold compress to reduce swelling  and pt can f/u with PCP. Pt understands and agrees with the plan, all questions answered.    MEDICAL DECISION MAKING  Results were reviewed/discussed with the patient and they were also made aware of online access. Pt also made aware that some labs, such as cultures, will not be resulted during ER visit and follow up with PMD is necessary.     MDM  Number of Diagnoses or Management Options     Amount and/or Complexity of Data Reviewed  Tests in the radiology section of CPT®: ordered and reviewed (XR's show no fractures or dislocations.)  Decide to obtain previous medical records or to obtain history from someone other than the patient: yes    Patient Progress  Patient progress: stable         DIAGNOSIS  Final diagnoses:   Multiple contusions       DISPOSITION  DISCHARGE    Patient discharged in stable condition.    Reviewed implications of results, diagnosis, meds, responsibility to follow up, warning signs and symptoms of possible worsening, potential complications and reasons to return to ER, including new or worsening sxs.    Patient/Family voiced understanding of above instructions.    Discussed plan for discharge, as there is no emergent indication for admission. Patient referred to primary care provider for BP management due to today's BP. Pt/family is agreeable and understands need for follow up and repeat testing.  Pt is aware that discharge does not mean that nothing is wrong but it indicates no emergency is present that requires admission and they must continue care with follow-up as given below or physician of their choice.     FOLLOW-UP  Naheed Gibbons, APRN  [address]  [place] [id number] [phone]               Medication List      New Prescriptions    acetaminophen-codeine 300-30 MG per tablet  Commonly known as:  TYLENOL #3  Take 1-2 tablets by mouth Every 6 (Six) Hours As Needed for Moderate Pain   .        Stop    chlorzoxazone 500 MG tablet  Commonly known as:   PARAFON FORTE     metaxalone 800 MG tablet  Commonly known as:  SKELAXIN     naproxen 500 MG tablet  Commonly known as:  NAPROSYN     PredniSONE 10 MG (21) tablet pack  Commonly known as:  DELTASONE          Latest Documented Vital Signs:  As of 3:39 AM  BP- 113/68 HR- (!) 130 Temp- 99.2 °F (37.3 °C) (Oral) O2 sat- 95%    --  Documentation assistance provided by dwayne Mcgovern for Dr. Cabral.  Information recorded by the scribtrang was done at my direction and has been verified and validated by me.     Emely Mcgovern  08/14/18 [id number]       Rory Cabral MD  08/14/18 [id number]

## 2018-08-16 ENCOUNTER — HOSPITAL ENCOUNTER (EMERGENCY)
Facility: HOSPITAL | Age: 33
Discharge: HOME | End: 2018-08-16
Attending: EMERGENCY MEDICINE | Admitting: EMERGENCY MEDICINE

## 2018-08-16 VITALS
SYSTOLIC BLOOD PRESSURE: 126 MMHG | DIASTOLIC BLOOD PRESSURE: 91 MMHG | HEIGHT: 67 IN | RESPIRATION RATE: 16 BRPM | TEMPERATURE: 98.8 F | OXYGEN SATURATION: 95 % | HEART RATE: 99 BPM

## 2018-08-16 DIAGNOSIS — R51.9 NONINTRACTABLE HEADACHE, UNSPECIFIED CHRONICITY PATTERN, UNSPECIFIED HEADACHE TYPE: Primary | ICD-10-CM

## 2018-08-16 DIAGNOSIS — Z76.5 MALINGERING: ICD-10-CM

## 2018-08-16 PROCEDURE — 99283 EMERGENCY DEPT VISIT LOW MDM: CPT

## 2018-08-16 RX ORDER — ACETAMINOPHEN 500 MG
1000 TABLET ORAL ONCE
Status: COMPLETED | OUTPATIENT
Start: 2018-08-16 | End: 2018-08-16

## 2018-08-16 RX ADMIN — ACETAMINOPHEN 1000 MG: 500 TABLET, FILM COATED ORAL at 23:01

## 2018-08-17 NOTE — ED PROVIDER NOTES
MD ATTESTATION NOTE    The KERRIE and I have discussed this patient's history, physical exam, and treatment plan.  I have reviewed the documentation and personally had a face to face interaction with the patient. I affirm the documentation and agree with the treatment and plan.  The attached note describes my personal findings.    Pt with headache that onset this AM. She reports the HA is temporal. Pt describes the HA has a migraine and reports this is similar to her previous migraines.     On exam,   Recent and remote memory functions are normal. The patient is attentive with normal concentration. Language is fluent. Speech is clear. The speech is non-dysarthric. Fund of knowledge is normal.   Symmetric smile with no facial droop.  Eyes close shut strongly bilaterally.  Symmetric eyebrow raise bilaterally.  EOMI, PERRL  CN II-XII grossly normal otherwise.  5/5 strength to extremities except 4/5 to RUE due to recent fall   No pronator drift.  Intact FNF.  No meningismus.     Plan to discharge.    Documentation assistance provided by dwayne Mello for Dr. Parker.  Information recorded by the scrchucke was done at my direction and has been verified and validated by me.           Guanakito Mello  08/16/18 7002       Cyrus Parker II, MD  08/17/18 3801

## 2018-08-17 NOTE — DISCHARGE INSTRUCTIONS
Continue current home medications  Follow up with PMD as needed  Return to er for worsening headache, fever, chills, vomiting or any new emergent concerns/symptoms

## 2018-08-17 NOTE — ED NOTES
"Pt reports \"i haven't been able to sleep in a few days and I don't feel good\". Pt also c/o abdominal pain and headache. Pt walked to triage with unsteady gait. Pt placed in wheelchair. pt with slow, slurred speech.     Denies ETOH, drug use.        Marichuy Bell RN  08/16/18 2124    "

## 2018-08-17 NOTE — ED PROVIDER NOTES
"EMERGENCY DEPARTMENT ENCOUNTER    CHIEF COMPLAINT  Chief Complaint: headache  History given by: patient  History limited by: nothing  Room Number: 02/02  PMD: Naheed Gibbons APRN      HPI:  Pt is a 33 y.o. female who presents with c/o headache that began today.  Patient also complains of being unable to sleep.  Patient denies fever, chills, vomiting.  Patient states that she took her \"migraine\" medication but still has headache.  Past Medical History of Depression, Asthma, Bipolar, Schizoaffective disorder    Duration: constant  Timing:today  Location: head  Radiation: none  Quality: \"hurt\"  Intensity/Severity: mild  Progression: unchanged  Associated Symptoms: insomnia  Aggravating Factors: none  Alleviating Factors: none  Previous Episodes: yes  Treatment before arrival: \"migraine\" medication    PAST MEDICAL HISTORY  Active Ambulatory Problems     Diagnosis Date Noted   • Asthma 02/06/2017   • Schizoaffective disorder (CMS/HCC) 02/06/2017   • Bipolar 1 disorder (CMS/HCC) 02/06/2017   • Community acquired pneumonia 02/06/2017   • Tobacco abuse 02/06/2017   • Snoring 02/06/2017   • Hearing difficulty 02/06/2017   • Mixed hyperlipidemia 02/20/2017   • Swallowed foreign body 07/21/2016   • Leukopenia 11/12/2016   • Adult body mass index greater than 30 11/12/2016   • Systemic infection (CMS/HCC) 11/12/2016   • Generalized abdominal pain 01/29/2018     Resolved Ambulatory Problems     Diagnosis Date Noted   • No Resolved Ambulatory Problems     Past Medical History:   Diagnosis Date   • Anxiety    • Asthma    • Bipolar 1 disorder (CMS/HCC)    • Bipolar 1 disorder, manic, moderate (CMS/HCC)    • Depression    • Schizoaffective disorder (CMS/HCC)        PAST SURGICAL HISTORY  Past Surgical History:   Procedure Laterality Date   • COLONOSCOPY N/A 2/27/2018    Procedure: COLONOSCOPY TO THE CECUM AND TI WITH COLD BX'S;  Surgeon: Gaurav Taylor MD;  Location: [facility] ENDOSCOPY;  Service:    • ENDOSCOPY  " 07/2016    Dr. Blanton @ [facility]   • GALLBLADDER SURGERY         FAMILY HISTORY  Family History   Problem Relation Age of Onset   • Anxiety disorder Mother    • Depression Mother        SOCIAL HISTORY  Social History     Social History   • Marital status: Single     Spouse name: N/A   • Number of children: N/A   • Years of education: N/A     Occupational History   • Not on file.     Social History Main Topics   • Smoking status: Current Every Day Smoker     Packs/day: 1.00     Types: Cigarettes   • Smokeless tobacco: Never Used   • Alcohol use Yes      Comment: occ.   • Drug use: Yes      Comment: smokes weed   • Sexual activity: Yes     Partners: Male     Other Topics Concern   • Not on file     Social History Narrative   • No narrative on file         ALLERGIES  Shellfish-derived products; Fish-derived products; Haldol [haloperidol]; Phenergan [promethazine hcl]; Promethazine; and Shrimp flavor    REVIEW OF SYSTEMS  Review of Systems   Constitutional: Negative for chills and fever.   HENT: Negative for sore throat.    Respiratory: Negative for cough.    Gastrointestinal: Negative for nausea and vomiting.   Genitourinary: Negative for dysuria.   Musculoskeletal: Negative for back pain.   Neurological: Positive for headaches.   Psychiatric/Behavioral: Positive for sleep disturbance. The patient is not nervous/anxious.        PHYSICAL EXAM  ED Triage Vitals   Temp Heart Rate Resp BP SpO2   08/16/18 2124 08/16/18 2124 08/16/18 2124 08/16/18 2152 08/16/18 2124   98.8 °F (37.1 °C) (!) 125 18 118/56 95 %       Physical Exam   Constitutional: She is well-developed, well-nourished, and in no distress.   HENT:   Head: Normocephalic.   Mouth/Throat: Mucous membranes are normal.   Eyes: No scleral icterus.   Neck: Normal range of motion.   Cardiovascular: Regular rhythm and normal heart sounds.  Tachycardia present.    Pulmonary/Chest: Effort normal and breath sounds normal.   Musculoskeletal: Normal range of motion.  "  Neurological: She is alert.   Pt drowsy and falling asleep during exam   Skin: Skin is warm and dry.   Psychiatric: Mood and affect normal.   Nursing note and vitals reviewed.          MEDICAL RECORD REVIEW  Patient has multiple ER visits here and at [facility]    PROGRESS AND CONSULTS  2200:  Pt sleeping in room, no apparent distress at this time  2235: Reviewed pt's history and workup with Dr. Parker.  At bedside evaluation, they agree with the plan of care.  Patient snoring in room.    2255:   Reviewed implications of results, diagnosis, meds, responsibility to follow up, warning signs and symptoms of possible worsening, potential complications and reasons to return to ER with patient.  Discussed all results and noted any abnormalities with patient.  Discussed absolute need to recheck abnormalities with PMD    Discussed plan for discharge, as there is no emergent indication for admission.  Pt is agreeable and understands need for follow up and repeat testing.  Pt is aware that discharge does not mean that nothing is wrong but it indicates no emergency is present.  Pt is discharged with instructions to follow up with primary care doctor to have their blood pressure rechecked.       DIAGNOSIS  Final diagnoses:   Nonintractable headache, unspecified chronicity pattern, unspecified headache type   Malingering       FOLLOW UP   Naheed Gibbons, APRN  [address] [address]  [phone]    Call in 1 day        MEDICATIONS GIVEN IN ER  Medications - No data to display    /56   Pulse (!) 125   Temp 98.8 °F (37.1 °C) (Tympanic)   Resp 18   Ht 170.2 cm (67\")   LMP 08/13/2018   SpO2 95%         Written by ANTONIO Carreon on 8/16/2018 at 10:35 PM.       Kylee Fontana, APRN  08/16/18 2259    "

## 2018-12-28 RX ORDER — LINACLOTIDE 290 UG/1
CAPSULE, GELATIN COATED ORAL
Qty: 30 CAPSULE | OUTPATIENT
Start: 2018-12-28

## 2019-01-24 RX ORDER — LINACLOTIDE 290 UG/1
CAPSULE, GELATIN COATED ORAL
Qty: 30 CAPSULE | OUTPATIENT
Start: 2019-01-24

## 2019-06-14 ENCOUNTER — TELEPHONE (OUTPATIENT)
Dept: OBSTETRICS AND GYNECOLOGY | Age: 34
End: 2019-06-14

## 2019-06-14 NOTE — TELEPHONE ENCOUNTER
"New ob pt needing to transfer care as she reports being 16 wks pregnant and believes her LMP was Feb/March 2019. Former pt of [facility] as pt states she was dismissed from that [facility] office when questioned for that reasoning pt replied, \"I don't know why!\" Pt then states she has scheduled w [facility] on [address] yet was called three days in advanced then told she could not be seen according to pt. She also states she doesn't know why she can't be seen at there other location. Pt was informed we would need to have her records and would be able to schedule her as an ob transfer possibly with Dr Bridges. Pt was transferred to medical records and will contact [facility] to begin this process. Pt says she will cb to sched.Please offer 6/25/19 at 1130 for new ob pt & u/s at the same time.   "

## 2020-08-19 ENCOUNTER — HOSPITAL ENCOUNTER (EMERGENCY)
Facility: HOSPITAL | Age: 35
Discharge: LEFT BEFORE DISCHARGE | End: 2020-08-19

## 2020-08-19 VITALS — RESPIRATION RATE: 18 BRPM | OXYGEN SATURATION: 97 % | TEMPERATURE: 98.7 F | HEART RATE: 102 BPM

## 2020-08-19 PROCEDURE — 93010 ELECTROCARDIOGRAM REPORT: CPT | Performed by: INTERNAL MEDICINE

## 2020-08-19 PROCEDURE — 93005 ELECTROCARDIOGRAM TRACING: CPT

## 2020-08-19 PROCEDURE — 99211 OFF/OP EST MAY X REQ PHY/QHP: CPT

## 2020-08-19 NOTE — ED TRIAGE NOTES
"Pt to ER via PV. Pt states \"I have blood clots in my lungs.\" Pt is on eliquis and states she was dx [id number] ago. Pt states chest heaviness/pain and tingling/burning in right arm.     Patient in mask. This RN in appropriate PPE - including mask, goggles, and gloves during all of patient care.     "

## 2020-08-20 NOTE — ED NOTES
Patient walking in and out of waiting room. Cursing and yelling at staff. Apologized for wait and redirected patient.      Genesis Bear RN  08/19/20 2008       Genesis Bear RN  08/19/20 2009

## 2020-08-20 NOTE — ED NOTES
"Pt walked out of ED stating \"Fuck this place. I'm never coming back to this hospital.\" Pt escorted out by security d/t her aggressive behavior.      Dai Taylor RN  08/19/20 2015    "

## 2022-07-08 ENCOUNTER — TELEPHONE (OUTPATIENT)
Dept: SURGERY | Facility: CLINIC | Age: 37
End: 2022-07-08

## 2022-07-08 ENCOUNTER — HOSPITAL ENCOUNTER (EMERGENCY)
Facility: HOSPITAL | Age: 37
Discharge: HOME | End: 2022-07-08
Attending: EMERGENCY MEDICINE | Admitting: EMERGENCY MEDICINE

## 2022-07-08 VITALS
OXYGEN SATURATION: 98 % | RESPIRATION RATE: 18 BRPM | HEART RATE: 98 BPM | TEMPERATURE: 99.1 F | SYSTOLIC BLOOD PRESSURE: 136 MMHG | DIASTOLIC BLOOD PRESSURE: 78 MMHG

## 2022-07-08 DIAGNOSIS — K61.1 PERIRECTAL ABSCESS: Primary | ICD-10-CM

## 2022-07-08 PROCEDURE — 99283 EMERGENCY DEPT VISIT LOW MDM: CPT

## 2022-07-08 PROCEDURE — 63710000001 ONDANSETRON ODT 4 MG TABLET DISPERSIBLE: Performed by: EMERGENCY MEDICINE

## 2022-07-08 RX ORDER — ONDANSETRON 4 MG/1
4 TABLET, ORALLY DISINTEGRATING ORAL ONCE
Status: COMPLETED | OUTPATIENT
Start: 2022-07-08 | End: 2022-07-08

## 2022-07-08 RX ORDER — LIDOCAINE HYDROCHLORIDE AND EPINEPHRINE 10; 10 MG/ML; UG/ML
10 INJECTION, SOLUTION INFILTRATION; PERINEURAL ONCE
Status: COMPLETED | OUTPATIENT
Start: 2022-07-08 | End: 2022-07-08

## 2022-07-08 RX ORDER — HYDROCODONE BITARTRATE AND ACETAMINOPHEN 7.5; 325 MG/1; MG/1
1 TABLET ORAL ONCE
Status: COMPLETED | OUTPATIENT
Start: 2022-07-08 | End: 2022-07-08

## 2022-07-08 RX ORDER — SULFAMETHOXAZOLE AND TRIMETHOPRIM 800; 160 MG/1; MG/1
2 TABLET ORAL 2 TIMES DAILY
Qty: 40 TABLET | Refills: 0 | Status: SHIPPED | OUTPATIENT
Start: 2022-07-08 | End: 2022-07-27

## 2022-07-08 RX ADMIN — ONDANSETRON 4 MG: 4 TABLET, ORALLY DISINTEGRATING ORAL at 19:43

## 2022-07-08 RX ADMIN — HYDROCODONE BITARTRATE AND ACETAMINOPHEN 1 TABLET: 7.5; 325 TABLET ORAL at 19:43

## 2022-07-08 RX ADMIN — LIDOCAINE HYDROCHLORIDE,EPINEPHRINE BITARTRATE 10 ML: 10; .01 INJECTION, SOLUTION INFILTRATION; PERINEURAL at 19:33

## 2022-07-08 NOTE — ED NOTES
Pt arrived via EMS from home with c/o of cyst on bottom that started five days ago, pt confirms drainage at site.

## 2022-07-08 NOTE — ED NOTES
"Pt walked into waiting room screaming \"Fuck this place, I didn't want to come here. Y'all are a bunch of goof troops\". Security was called and pt was taken outside to calm down and deescalate with  Amadeo.   "

## 2022-07-08 NOTE — ED PROVIDER NOTES
EMERGENCY DEPARTMENT ENCOUNTER    Room Number:  A03/03  Date of encounter:  7/8/2022  PCP: Naheed Gibbons APRN  Historian: Patient      I used full protective equipment while examining this patient.  This includes face mask, gloves and protective eyewear.  I washed my hands before entering the room and immediately upon leaving the room      HPI:  Chief Complaint: Rectal pain  A complete HPI/ROS/PMH/PSH/SH/FH are unobtainable due to: Nothing    Context: Laya Castro is a 36 y.o. female who presents to the ED c/o rectal pain and swelling x5 days.  Patient states that swelling started gradually and is progressively become worse.  She has noticed bleeding and purulent discharge from the area intermittently.  She denies any fevers or chills.  Sitting and bowel movements and worsening pain.  She does have an appointment with a general surgeon on Tuesday.    Review of Medical Records  I reviewed patient's last ER visit from 6/14/2022.  Patient seen for fever.    PAST MEDICAL HISTORY  Active Ambulatory Problems     Diagnosis Date Noted   • Asthma 02/06/2017   • Schizoaffective disorder (HCC) 02/06/2017   • Bipolar 1 disorder ([facility]) 02/06/2017   • Community acquired pneumonia 02/06/2017   • Tobacco abuse 02/06/2017   • Snoring 02/06/2017   • Hearing difficulty 02/06/2017   • Mixed hyperlipidemia 02/20/2017   • Swallowed foreign body 07/21/2016   • Leukopenia 11/12/2016   • Adult body mass index greater than 30 11/12/2016   • Systemic infection (HCC) 11/12/2016   • Generalized abdominal pain 01/29/2018     Resolved Ambulatory Problems     Diagnosis Date Noted   • No Resolved Ambulatory Problems     Past Medical History:   Diagnosis Date   • Anxiety    • Bipolar 1 disorder, manic, moderate (CMS/HCC)    • Depression          PAST SURGICAL HISTORY  Past Surgical History:   Procedure Laterality Date   • COLONOSCOPY N/A 2/27/2018    Procedure: COLONOSCOPY TO THE CECUM AND TI WITH COLD BX'S;  Surgeon: Gaurav Delgado  MD Brandon;  Location: [facility] ENDOSCOPY;  Service:    • ENDOSCOPY  07/2016    Dr. Blanton @ [facility]   • GALLBLADDER SURGERY           FAMILY HISTORY  Family History   Problem Relation Age of Onset   • Anxiety disorder Mother    • Depression Mother          SOCIAL HISTORY  Social History     Socioeconomic History   • Marital status: Single   Tobacco Use   • Smoking status: Current Every Day Smoker     Packs/day: 1.00     Types: Cigarettes   • Smokeless tobacco: Never Used   Substance and Sexual Activity   • Alcohol use: Yes     Comment: occ.   • Drug use: Yes     Comment: smokes weed   • Sexual activity: Yes     Partners: Male         ALLERGIES  Shellfish-derived products, Fish-derived products, Haldol [haloperidol], Phenergan [promethazine hcl], Promethazine, and Shrimp flavor        REVIEW OF SYSTEMS  All systems reviewed and negative except for those discussed in HPI.       PHYSICAL EXAM    I have reviewed the triage vital signs and nursing notes.    ED Triage Vitals [07/08/22 1449]   Temp Heart Rate Resp BP SpO2   99.1 °F (37.3 °C) 98 18 136/78 98 %      Temp src Heart Rate Source Patient Position BP Location FiO2 (%)   Tympanic -- -- -- --       Physical Exam    GENERAL: Alert, oriented, not distressed  HENT: head atraumatic, no nuchal rigidity  EYES: no scleral icterus, EOMI  CV: regular rhythm, regular rate, no murmur  RESPIRATORY: normal effort, CTA  ABDOMEN: soft, nontender  : Small perirectal appearing lesion to the superior rectal area.  No significant erythema.  Mild fluctuance.  MUSCULOSKELETAL: no deformity, FROM, no calf swelling or tenderness  NEURO: alert, moves all extremities, follows commands  SKIN: warm, dry    Incision & Drainage    Date/Time: 7/8/2022 7:48 PM  Performed by: Emmanuel Feliciano PA  Authorized by: Vj Das MD     Consent:     Consent obtained:  Verbal    Consent given by:  Patient  Universal protocol:     Patient identity confirmed:  Verbally with patient  Location:      Type:  Abscess    Location:  Anogenital    Anogenital location:  Perirectal  Pre-procedure details:     Skin preparation:  Chlorhexidine  Sedation:     Sedation type:  None  Anesthesia:     Anesthesia method:  Local infiltration    Local anesthetic:  Lidocaine 1% WITH epi  Procedure type:     Complexity:  Simple  Procedure details:     Incision types:  Single straight    Incision depth:  Subcutaneous    Drainage:  Bloody    Drainage amount:  Scant    Packing materials:  None  Post-procedure details:     Procedure completion:  Tolerated well, no immediate complications            MEDICATIONS GIVEN IN ER    Medications   lidocaine 1% - EPINEPHrine 1:749631 (XYLOCAINE W/EPI) 1 %-1:606627 injection 10 mL (10 mL Injection Given by Other 7/8/22 1933)   HYDROcodone-acetaminophen (NORCO) 7.5-325 MG per tablet 1 tablet (1 tablet Oral Given 7/8/22 1943)   ondansetron ODT (ZOFRAN-ODT) disintegrating tablet 4 mg (4 mg Oral Given 7/8/22 1943)         PROGRESS, DATA ANALYSIS, CONSULTS, AND MEDICAL DECISION MAKING    All labs have been independently reviewed by me.  All radiology studies have been reviewed by me and discussed with radiologist dictating the report.   EKG's independently viewed and interpreted by me.  Discussion below represents my analysis of pertinent findings related to patient's condition, differential diagnosis, treatment plan and final disposition.    I have discussed case with Dr. Das, emergency room physician.  He has performed his own bedside examination and agrees with treatment plan.    ED Course as of 07/08/22 1948 Fri Jul 08, 2022   1800 Patient presents with abscess to rectal area x5 days.  She states the area has opened up once and blood and pus came out.  She denies any fevers or chills.  Patient appears to have a fluctuant perirectal abscess.  Plan for I&D with general surgery follow-up. [EE]   1947 Not much purulent material on I&D.  Only bloody discharge.  We will have the patient use  sitz bath's.  She has an appointment with colorectal surgery on Tuesday.  Antibiotics in the meantime. [EE]      ED Course User Index  [EE] Emmanuel Feliciano PA       AS OF 19:48 EDT VITALS:    BP - 136/78  HR - 98  TEMP - 99.1 °F (37.3 °C) (Tympanic)  O2 SATS - 98%        DIAGNOSIS  Final diagnoses:   Perirectal abscess         DISPOSITION  Discharged      Dictated utilizing Dragon dictation     Emmanuel Feliciano PA  07/08/22 2674

## 2022-07-08 NOTE — TELEPHONE ENCOUNTER
Hub staff attempted to follow warm transfer process and was unsuccessful     Caller: MIMI EL    Relationship to patient: SELF    Best call back number: [phone]    Patient is needing: CURRENTLY AT  ED. ANAL CYST HAS BUSTED AND IS STILL DRAINING AND SHE IS IN EXCRUTIATING PAIN. WANT S TO KNOW WHAT SHE CAN DO TO PROVIDE RELIEF WHILE WAITING. SHE CLAIMS THERE ARE 30 PT'S IN ED AND SHE FEELS LIKE THEY ARE MOVING REALLY SLOW.

## 2022-07-08 NOTE — ED PROVIDER NOTES
"MD ATTESTATION NOTE    The KERRIE and I have discussed this patient's history, physical exam, and treatment plan.  I have reviewed the documentation and personally had a face to face interaction with the patient. I affirm the documentation and agree with the treatment and plan.  The attached note describes my personal findings.      I provided a substantive portion of the care of the patient.  I personally performed the physical exam in its entirety, and below are my findings.  For this patient encounter, the patient wore surgical mask, I wore full protective PPE including N95 and eye protection.      Brief HPI: Patient complains of a cyst in her gluteal crease for the past 4 to 5 days.  She saw her PCP 2 days ago and was referred to a surgeon.  This appointment is early next week.  Patient states \"I cannot wait that long\".  She reports this area is painful and intermittently bleeds.  Denies fever or chills.    PHYSICAL EXAM  ED Triage Vitals [07/08/22 1449]   Temp Heart Rate Resp BP SpO2   99.1 °F (37.3 °C) 98 18 136/78 98 %      Temp src Heart Rate Source Patient Position BP Location FiO2 (%)   Tympanic -- -- -- --         GENERAL: Awake and alert.  Nontoxic appearing.  Resting comfortably in no acute distress  HENT: nares patent  EYES: no scleral icterus  CV: regular rhythm, normal rate  RESPIRATORY: normal effort, clear to auscultation bilaterally  ABDOMEN: soft, nontender  MUSCULOSKELETAL: no deformity  NEURO: alert, moves all extremities, follows commands  PSYCH:  calm, cooperative  SKIN: There is a tender, indurated abscess in the inferior gluteal crease.  There is no surrounding erythema.    Vital signs and nursing notes reviewed.        Plan: I&D     Vj Das MD  07/08/22 1823    "

## 2022-07-11 PROBLEM — F32.A DEPRESSIVE DISORDER: Status: ACTIVE | Noted: 2019-05-10

## 2022-07-11 PROBLEM — I82.402 DEEP VEIN THROMBOSIS (DVT) OF LEFT LOWER EXTREMITY (HCC): Status: ACTIVE | Noted: 2020-01-18

## 2022-07-11 PROBLEM — M19.90 ARTHRITIS: Status: ACTIVE | Noted: 2022-05-06

## 2022-07-11 PROBLEM — I26.99 ACUTE PULMONARY EMBOLISM (HCC): Status: ACTIVE | Noted: 2020-01-22

## 2022-07-11 PROBLEM — F89 DEVELOPMENTAL DISORDER: Status: ACTIVE | Noted: 2022-03-02

## 2022-07-11 PROBLEM — R10.2 PAIN IN FEMALE PELVIS: Status: ACTIVE | Noted: 2020-05-04

## 2022-07-11 PROBLEM — Z86.711 PERSONAL HISTORY OF PULMONARY EMBOLISM: Status: ACTIVE | Noted: 2021-08-20

## 2022-07-11 PROBLEM — M41.9 SCOLIOSIS: Status: ACTIVE | Noted: 2019-05-10

## 2022-07-11 PROBLEM — R79.89 ELEVATED LIVER FUNCTION TESTS: Status: ACTIVE | Noted: 2020-01-23

## 2022-07-11 PROBLEM — G56.00 CARPAL TUNNEL SYNDROME: Status: ACTIVE | Noted: 2019-11-13

## 2022-07-11 PROBLEM — R31.9 HEMATURIA: Status: ACTIVE | Noted: 2020-01-23

## 2022-07-11 PROBLEM — D68.9: Status: ACTIVE | Noted: 2020-01-23

## 2022-07-11 PROBLEM — F41.9 ANXIETY DISORDER: Status: ACTIVE | Noted: 2022-03-02

## 2022-07-11 PROBLEM — Z20.822 SUSPECTED SEVERE ACUTE RESPIRATORY SYNDROME CORONAVIRUS 2 (SARS-COV-2) INFECTION: Status: ACTIVE | Noted: 2022-01-28

## 2022-07-11 PROBLEM — O09.90 SUPERVISION OF HIGH RISK PREGNANCY, UNSPECIFIED, UNSPECIFIED TRIMESTER: Status: ACTIVE | Noted: 2019-07-03

## 2022-07-11 PROBLEM — F31.2 SEVERE MANIC BIPOLAR I DISORDER WITH PSYCHOTIC FEATURES (HCC): Status: ACTIVE | Noted: 2022-03-02

## 2022-07-11 PROBLEM — F19.99: Status: ACTIVE | Noted: 2020-05-04

## 2022-07-11 PROBLEM — IMO0002 ADVANCED MATERNAL AGE DURING PREGNANCY: Status: ACTIVE | Noted: 2019-09-03

## 2022-07-11 PROBLEM — Z79.01 ANTICOAGULATED: Status: ACTIVE | Noted: 2021-08-20

## 2022-07-11 RX ORDER — PANTOPRAZOLE SODIUM 40 MG/1
40 TABLET, DELAYED RELEASE ORAL DAILY
COMMUNITY
Start: 2022-01-28 | End: 2022-07-27

## 2022-07-11 RX ORDER — PNV NO.95/FERROUS FUM/FOLIC AC 28MG-0.8MG
TABLET ORAL
COMMUNITY
End: 2022-07-27

## 2022-07-11 RX ORDER — LANOLIN ALCOHOL/MO/W.PET/CERES
CREAM (GRAM) TOPICAL
COMMUNITY

## 2022-07-11 RX ORDER — FLUCONAZOLE 150 MG/1
TABLET ORAL
COMMUNITY
Start: 2022-01-27 | End: 2022-07-27

## 2022-07-11 RX ORDER — LORATADINE 10 MG/1
TABLET, ORALLY DISINTEGRATING ORAL
COMMUNITY

## 2022-07-11 RX ORDER — METOPROLOL SUCCINATE 25 MG/1
25 TABLET, EXTENDED RELEASE ORAL DAILY
COMMUNITY
Start: 2022-02-24

## 2022-07-11 RX ORDER — ONDANSETRON 4 MG/1
TABLET, FILM COATED ORAL
COMMUNITY

## 2022-07-11 RX ORDER — BENZONATATE 100 MG/1
100 CAPSULE ORAL
COMMUNITY
Start: 2022-05-23 | End: 2022-07-27

## 2022-07-11 RX ORDER — OXCARBAZEPINE 600 MG/1
TABLET, FILM COATED ORAL
COMMUNITY
Start: 2015-07-15 | End: 2022-07-27

## 2022-07-11 RX ORDER — DIVALPROEX SODIUM 500 MG/1
750 TABLET, DELAYED RELEASE ORAL NIGHTLY
COMMUNITY
Start: 2022-06-16 | End: 2022-08-14

## 2022-07-11 RX ORDER — VENLAFAXINE HYDROCHLORIDE 75 MG/1
CAPSULE, EXTENDED RELEASE ORAL
COMMUNITY
Start: 2022-02-16 | End: 2022-07-27

## 2022-07-11 RX ORDER — GABAPENTIN 800 MG/1
TABLET ORAL
COMMUNITY
Start: 2015-07-15 | End: 2022-07-27

## 2022-07-11 RX ORDER — FLUTICASONE PROPIONATE 50 MCG
1 SPRAY, SUSPENSION (ML) NASAL DAILY
COMMUNITY
Start: 2022-06-14

## 2022-07-11 RX ORDER — ACETAMINOPHEN 500 MG
1000 TABLET ORAL
COMMUNITY
Start: 2022-05-23

## 2022-07-11 RX ORDER — LIDOCAINE 50 MG/G
PATCH TOPICAL
COMMUNITY
Start: 2022-05-17

## 2022-07-11 RX ORDER — PSEUDOEPHEDRINE HCL 30 MG
TABLET ORAL
COMMUNITY
Start: 2019-11-23 | End: 2022-07-28 | Stop reason: HOSPADM

## 2022-07-11 RX ORDER — BUDESONIDE AND FORMOTEROL FUMARATE DIHYDRATE 160; 4.5 UG/1; UG/1
2 AEROSOL RESPIRATORY (INHALATION)
COMMUNITY
Start: 2022-03-01

## 2022-07-12 ENCOUNTER — OFFICE VISIT (OUTPATIENT)
Dept: SURGERY | Facility: CLINIC | Age: 37
End: 2022-07-12

## 2022-07-12 VITALS
HEIGHT: 68 IN | TEMPERATURE: 97.5 F | HEART RATE: 81 BPM | OXYGEN SATURATION: 98 % | SYSTOLIC BLOOD PRESSURE: 108 MMHG | WEIGHT: 289.2 LBS | DIASTOLIC BLOOD PRESSURE: 78 MMHG | BODY MASS INDEX: 43.83 KG/M2

## 2022-07-12 DIAGNOSIS — K60.3 FISTULA, ANAL: Primary | ICD-10-CM

## 2022-07-12 DIAGNOSIS — K62.89 RECTAL PAIN: ICD-10-CM

## 2022-07-12 PROBLEM — K60.30 FISTULA, ANAL: Status: ACTIVE | Noted: 2022-07-12

## 2022-07-12 PROCEDURE — 99204 OFFICE O/P NEW MOD 45 MIN: CPT | Performed by: PHYSICIAN ASSISTANT

## 2022-07-12 RX ORDER — APIXABAN 5 MG/1
TABLET, FILM COATED ORAL
COMMUNITY
Start: 2022-05-27 | End: 2022-07-27

## 2022-07-12 RX ORDER — CEFAZOLIN SODIUM 2 G/100ML
2 INJECTION, SOLUTION INTRAVENOUS ONCE
Status: CANCELLED | OUTPATIENT
Start: 2022-07-28 | End: 2022-07-12

## 2022-07-12 RX ORDER — METRONIDAZOLE 500 MG/100ML
500 INJECTION, SOLUTION INTRAVENOUS ONCE
Status: CANCELLED | OUTPATIENT
Start: 2022-07-28 | End: 2022-07-12

## 2022-07-12 RX ORDER — QUETIAPINE FUMARATE 400 MG/1
400 TABLET, FILM COATED ORAL NIGHTLY
COMMUNITY
Start: 2022-06-16

## 2022-07-12 RX ORDER — TRAZODONE HYDROCHLORIDE 100 MG/1
100 TABLET ORAL NIGHTLY
COMMUNITY
Start: 2022-06-16
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
## 2022-07-27 ENCOUNTER — PRE-ADMISSION TESTING (OUTPATIENT)
Dept: PREADMISSION TESTING | Facility: HOSPITAL | Age: 37
End: 2022-07-27

## 2022-07-27 VITALS
HEIGHT: 67 IN | HEART RATE: 92 BPM | BODY MASS INDEX: 43.95 KG/M2 | DIASTOLIC BLOOD PRESSURE: 68 MMHG | OXYGEN SATURATION: 97 % | RESPIRATION RATE: 16 BRPM | WEIGHT: 280 LBS | TEMPERATURE: 98.5 F | SYSTOLIC BLOOD PRESSURE: 115 MMHG

## 2022-07-27 DIAGNOSIS — K60.3 FISTULA, ANAL: ICD-10-CM

## 2022-07-27 LAB
ANION GAP SERPL CALCULATED.3IONS-SCNC: 11 MMOL/L (ref 5–15)
BUN SERPL-MCNC: 13 MG/DL (ref 6–20)
BUN/CREAT SERPL: 14.8 (ref 7–25)
CALCIUM SPEC-SCNC: 9.4 MG/DL (ref 8.6–10.5)
CHLORIDE SERPL-SCNC: 102 MMOL/L (ref 98–107)
CO2 SERPL-SCNC: 27 MMOL/L (ref 22–29)
CREAT SERPL-MCNC: 0.88 MG/DL (ref 0.57–1)
DEPRECATED RDW RBC AUTO: 45.1 FL (ref 37–54)
EGFRCR SERPLBLD CKD-EPI 2021: 87.5 ML/MIN/1.73
ERYTHROCYTE [DISTWIDTH] IN BLOOD BY AUTOMATED COUNT: 12.6 % (ref 12.3–15.4)
GLUCOSE SERPL-MCNC: 135 MG/DL (ref 65–99)
HCG SERPL QL: NEGATIVE
HCT VFR BLD AUTO: 40.7 % (ref 34–46.6)
HGB BLD-MCNC: 13.8 G/DL (ref 12–15.9)
MCH RBC QN AUTO: 33.4 PG (ref 26.6–33)
MCHC RBC AUTO-ENTMCNC: 33.9 G/DL (ref 31.5–35.7)
MCV RBC AUTO: 98.5 FL (ref 79–97)
PLATELET # BLD AUTO: 160 10*3/MM3 (ref 140–450)
PMV BLD AUTO: 11.5 FL (ref 6–12)
POTASSIUM SERPL-SCNC: 4.3 MMOL/L (ref 3.5–5.2)
RBC # BLD AUTO: 4.13 10*6/MM3 (ref 3.77–5.28)
SARS-COV-2 ORF1AB RESP QL NAA+PROBE: NOT DETECTED
SODIUM SERPL-SCNC: 140 MMOL/L (ref 136–145)
WBC NRBC COR # BLD: 6.08 10*3/MM3 (ref 3.4–10.8)

## 2022-07-27 PROCEDURE — 93010 ELECTROCARDIOGRAM REPORT: CPT | Performed by: INTERNAL MEDICINE

## 2022-07-27 PROCEDURE — 85027 COMPLETE CBC AUTOMATED: CPT

## 2022-07-27 PROCEDURE — U0004 COV-19 TEST NON-CDC HGH THRU: HCPCS

## 2022-07-27 PROCEDURE — C9803 HOPD COVID-19 SPEC COLLECT: HCPCS

## 2022-07-27 PROCEDURE — 93005 ELECTROCARDIOGRAM TRACING: CPT

## 2022-07-27 PROCEDURE — 80048 BASIC METABOLIC PNL TOTAL CA: CPT

## 2022-07-27 PROCEDURE — 36415 COLL VENOUS BLD VENIPUNCTURE: CPT

## 2022-07-27 PROCEDURE — 84703 CHORIONIC GONADOTROPIN ASSAY: CPT

## 2022-07-27 RX ORDER — BUSPIRONE HYDROCHLORIDE 10 MG/1
10 TABLET ORAL 3 TIMES DAILY
COMMUNITY

## 2022-07-28 ENCOUNTER — ANESTHESIA (OUTPATIENT)
Dept: PERIOP | Facility: HOSPITAL | Age: 37
End: 2022-07-28

## 2022-07-28 ENCOUNTER — HOSPITAL ENCOUNTER (OUTPATIENT)
Facility: HOSPITAL | Age: 37
Setting detail: HOSPITAL OUTPATIENT SURGERY
Discharge: HOME | End: 2022-07-28
Attending: COLON & RECTAL SURGERY | Admitting: COLON & RECTAL SURGERY

## 2022-07-28 ENCOUNTER — ANESTHESIA EVENT (OUTPATIENT)
Dept: PERIOP | Facility: HOSPITAL | Age: 37
End: 2022-07-28

## 2022-07-28 VITALS
DIASTOLIC BLOOD PRESSURE: 68 MMHG | SYSTOLIC BLOOD PRESSURE: 92 MMHG | TEMPERATURE: 97.5 F | HEIGHT: 67 IN | RESPIRATION RATE: 18 BRPM | BODY MASS INDEX: 43.79 KG/M2 | WEIGHT: 279 LBS | HEART RATE: 66 BPM | OXYGEN SATURATION: 95 %

## 2022-07-28 DIAGNOSIS — K60.3 FISTULA, ANAL: ICD-10-CM

## 2022-07-28 LAB — QT INTERVAL: 361 MS

## 2022-07-28 PROCEDURE — 46040 I&D ISCHIORCT&/PERIRCT ABSC: CPT | Performed by: COLON & RECTAL SURGERY

## 2022-07-28 PROCEDURE — 25010000002 FENTANYL CITRATE (PF) 50 MCG/ML SOLUTION

## 2022-07-28 PROCEDURE — 25010000002 ONDANSETRON PER 1 MG

## 2022-07-28 PROCEDURE — 25010000002 HYDROMORPHONE PER 4 MG

## 2022-07-28 PROCEDURE — 25010000002 PROPOFOL 10 MG/ML EMULSION

## 2022-07-28 PROCEDURE — 25010000002 CEFAZOLIN PER 500 MG: Performed by: PHYSICIAN ASSISTANT

## 2022-07-28 PROCEDURE — 25010000002 DEXAMETHASONE PER 1 MG

## 2022-07-28 PROCEDURE — 25010000002 MIDAZOLAM PER 1 MG: Performed by: ANESTHESIOLOGY

## 2022-07-28 RX ORDER — FAMOTIDINE 10 MG/ML
20 INJECTION, SOLUTION INTRAVENOUS ONCE
Status: COMPLETED | OUTPATIENT
Start: 2022-07-28 | End: 2022-07-28

## 2022-07-28 RX ORDER — FENTANYL CITRATE 50 UG/ML
50 INJECTION, SOLUTION INTRAMUSCULAR; INTRAVENOUS
Status: DISCONTINUED | OUTPATIENT
Start: 2022-07-28 | End: 2022-07-28 | Stop reason: HOSPADM

## 2022-07-28 RX ORDER — EPHEDRINE SULFATE 50 MG/ML
5 INJECTION, SOLUTION INTRAVENOUS ONCE AS NEEDED
Status: DISCONTINUED | OUTPATIENT
Start: 2022-07-28 | End: 2022-07-28 | Stop reason: HOSPADM

## 2022-07-28 RX ORDER — FENTANYL CITRATE 50 UG/ML
INJECTION, SOLUTION INTRAMUSCULAR; INTRAVENOUS AS NEEDED
Status: DISCONTINUED | OUTPATIENT
Start: 2022-07-28 | End: 2022-07-28 | Stop reason: SURG

## 2022-07-28 RX ORDER — DIPHENHYDRAMINE HCL 25 MG
25 CAPSULE ORAL
Status: DISCONTINUED | OUTPATIENT
Start: 2022-07-28 | End: 2022-07-28 | Stop reason: HOSPADM

## 2022-07-28 RX ORDER — SODIUM CHLORIDE, SODIUM LACTATE, POTASSIUM CHLORIDE, CALCIUM CHLORIDE 600; 310; 30; 20 MG/100ML; MG/100ML; MG/100ML; MG/100ML
9 INJECTION, SOLUTION INTRAVENOUS CONTINUOUS
Status: DISCONTINUED | OUTPATIENT
Start: 2022-07-28 | End: 2022-07-28 | Stop reason: HOSPADM

## 2022-07-28 RX ORDER — MAGNESIUM HYDROXIDE 1200 MG/15ML
LIQUID ORAL AS NEEDED
Status: DISCONTINUED | OUTPATIENT
Start: 2022-07-28 | End: 2022-07-28 | Stop reason: HOSPADM

## 2022-07-28 RX ORDER — LIDOCAINE HYDROCHLORIDE 20 MG/ML
INJECTION, SOLUTION INFILTRATION; PERINEURAL AS NEEDED
Status: DISCONTINUED | OUTPATIENT
Start: 2022-07-28 | End: 2022-07-28 | Stop reason: SURG

## 2022-07-28 RX ORDER — HYDROCODONE BITARTRATE AND ACETAMINOPHEN 5; 325 MG/1; MG/1
2 TABLET ORAL ONCE AS NEEDED
Status: DISCONTINUED | OUTPATIENT
Start: 2022-07-28 | End: 2022-07-28 | Stop reason: HOSPADM

## 2022-07-28 RX ORDER — METOPROLOL SUCCINATE 50 MG/1
25 TABLET, EXTENDED RELEASE ORAL ONCE
Status: DISCONTINUED | OUTPATIENT
Start: 2022-07-28 | End: 2022-07-28 | Stop reason: HOSPADM

## 2022-07-28 RX ORDER — IBUPROFEN 600 MG/1
600 TABLET ORAL ONCE AS NEEDED
Status: DISCONTINUED | OUTPATIENT
Start: 2022-07-28 | End: 2022-07-28 | Stop reason: HOSPADM

## 2022-07-28 RX ORDER — ONDANSETRON 4 MG/1
4 TABLET, FILM COATED ORAL ONCE AS NEEDED
Status: DISCONTINUED | OUTPATIENT
Start: 2022-07-28 | End: 2022-07-28 | Stop reason: HOSPADM

## 2022-07-28 RX ORDER — LABETALOL HYDROCHLORIDE 5 MG/ML
5 INJECTION, SOLUTION INTRAVENOUS
Status: DISCONTINUED | OUTPATIENT
Start: 2022-07-28 | End: 2022-07-28 | Stop reason: HOSPADM

## 2022-07-28 RX ORDER — SODIUM CHLORIDE 0.9 % (FLUSH) 0.9 %
3 SYRINGE (ML) INJECTION EVERY 12 HOURS SCHEDULED
Status: DISCONTINUED | OUTPATIENT
Start: 2022-07-28 | End: 2022-07-28 | Stop reason: HOSPADM

## 2022-07-28 RX ORDER — NALOXONE HCL 0.4 MG/ML
0.2 VIAL (ML) INJECTION AS NEEDED
Status: DISCONTINUED | OUTPATIENT
Start: 2022-07-28 | End: 2022-07-28 | Stop reason: HOSPADM

## 2022-07-28 RX ORDER — OXYCODONE AND ACETAMINOPHEN 7.5; 325 MG/1; MG/1
1 TABLET ORAL EVERY 4 HOURS PRN
Status: DISCONTINUED | OUTPATIENT
Start: 2022-07-28 | End: 2022-07-28 | Stop reason: HOSPADM

## 2022-07-28 RX ORDER — ONDANSETRON 2 MG/ML
4 INJECTION INTRAMUSCULAR; INTRAVENOUS ONCE AS NEEDED
Status: DISCONTINUED | OUTPATIENT
Start: 2022-07-28 | End: 2022-07-28 | Stop reason: HOSPADM

## 2022-07-28 RX ORDER — HYDRALAZINE HYDROCHLORIDE 20 MG/ML
5 INJECTION INTRAMUSCULAR; INTRAVENOUS
Status: DISCONTINUED | OUTPATIENT
Start: 2022-07-28 | End: 2022-07-28 | Stop reason: HOSPADM

## 2022-07-28 RX ORDER — GLYCOPYRROLATE 0.2 MG/ML
INJECTION INTRAMUSCULAR; INTRAVENOUS AS NEEDED
Status: DISCONTINUED | OUTPATIENT
Start: 2022-07-28 | End: 2022-07-28 | Stop reason: SURG

## 2022-07-28 RX ORDER — MIDAZOLAM HYDROCHLORIDE 1 MG/ML
1 INJECTION INTRAMUSCULAR; INTRAVENOUS
Status: DISCONTINUED | OUTPATIENT
Start: 2022-07-28 | End: 2022-07-28 | Stop reason: HOSPADM

## 2022-07-28 RX ORDER — HYDROCODONE BITARTRATE AND ACETAMINOPHEN 5; 325 MG/1; MG/1
TABLET ORAL
Qty: 32 TABLET | Refills: 0 | Status: SHIPPED | OUTPATIENT
Start: 2022-07-28

## 2022-07-28 RX ORDER — HYDROMORPHONE HYDROCHLORIDE 1 MG/ML
0.5 INJECTION, SOLUTION INTRAMUSCULAR; INTRAVENOUS; SUBCUTANEOUS
Status: DISCONTINUED | OUTPATIENT
Start: 2022-07-28 | End: 2022-07-28 | Stop reason: HOSPADM

## 2022-07-28 RX ORDER — METRONIDAZOLE 500 MG/100ML
500 INJECTION, SOLUTION INTRAVENOUS ONCE
Status: COMPLETED | OUTPATIENT
Start: 2022-07-28 | End: 2022-07-28

## 2022-07-28 RX ORDER — HYDROCODONE BITARTRATE AND ACETAMINOPHEN 7.5; 325 MG/1; MG/1
1 TABLET ORAL ONCE AS NEEDED
Status: COMPLETED | OUTPATIENT
Start: 2022-07-28 | End: 2022-07-28

## 2022-07-28 RX ORDER — SODIUM CHLORIDE 0.9 % (FLUSH) 0.9 %
3-10 SYRINGE (ML) INJECTION AS NEEDED
Status: DISCONTINUED | OUTPATIENT
Start: 2022-07-28 | End: 2022-07-28 | Stop reason: HOSPADM

## 2022-07-28 RX ORDER — CEFAZOLIN SODIUM 2 G/100ML
2 INJECTION, SOLUTION INTRAVENOUS ONCE
Status: COMPLETED | OUTPATIENT
Start: 2022-07-28 | End: 2022-07-28

## 2022-07-28 RX ORDER — DEXAMETHASONE SODIUM PHOSPHATE 10 MG/ML
INJECTION INTRAMUSCULAR; INTRAVENOUS AS NEEDED
Status: DISCONTINUED | OUTPATIENT
Start: 2022-07-28 | End: 2022-07-28 | Stop reason: SURG

## 2022-07-28 RX ORDER — PROPOFOL 10 MG/ML
VIAL (ML) INTRAVENOUS AS NEEDED
Status: DISCONTINUED | OUTPATIENT
Start: 2022-07-28 | End: 2022-07-28 | Stop reason: SURG

## 2022-07-28 RX ORDER — ONDANSETRON 2 MG/ML
INJECTION INTRAMUSCULAR; INTRAVENOUS AS NEEDED
Status: DISCONTINUED | OUTPATIENT
Start: 2022-07-28 | End: 2022-07-28 | Stop reason: SURG

## 2022-07-28 RX ORDER — POLYETHYLENE GLYCOL 3350 17 G/17G
17 POWDER, FOR SOLUTION ORAL 2 TIMES DAILY
Start: 2022-07-28

## 2022-07-28 RX ORDER — ROCURONIUM BROMIDE 10 MG/ML
INJECTION, SOLUTION INTRAVENOUS AS NEEDED
Status: DISCONTINUED | OUTPATIENT
Start: 2022-07-28 | End: 2022-07-28 | Stop reason: SURG

## 2022-07-28 RX ORDER — FLUMAZENIL 0.1 MG/ML
0.2 INJECTION INTRAVENOUS AS NEEDED
Status: DISCONTINUED | OUTPATIENT
Start: 2022-07-28 | End: 2022-07-28 | Stop reason: HOSPADM

## 2022-07-28 RX ORDER — LIDOCAINE HYDROCHLORIDE 10 MG/ML
0.5 INJECTION, SOLUTION EPIDURAL; INFILTRATION; INTRACAUDAL; PERINEURAL ONCE AS NEEDED
Status: DISCONTINUED | OUTPATIENT
Start: 2022-07-28 | End: 2022-07-28 | Stop reason: HOSPADM

## 2022-07-28 RX ORDER — LIDOCAINE 50 MG/G
1 OINTMENT TOPICAL EVERY 4 HOURS PRN
Qty: 35.44 G | Refills: 4 | Status: SHIPPED | OUTPATIENT
Start: 2022-07-28 | End: 2022-08-27

## 2022-07-28 RX ORDER — DIPHENHYDRAMINE HYDROCHLORIDE 50 MG/ML
12.5 INJECTION INTRAMUSCULAR; INTRAVENOUS
Status: DISCONTINUED | OUTPATIENT
Start: 2022-07-28 | End: 2022-07-28 | Stop reason: HOSPADM

## 2022-07-28 RX ADMIN — HYDROMORPHONE HYDROCHLORIDE 0.5 MG: 1 INJECTION, SOLUTION INTRAMUSCULAR; INTRAVENOUS; SUBCUTANEOUS at 11:26

## 2022-07-28 RX ADMIN — CEFAZOLIN 2 G: 10 INJECTION, POWDER, FOR SOLUTION INTRAVENOUS at 09:55

## 2022-07-28 RX ADMIN — MIDAZOLAM 1 MG: 1 INJECTION INTRAMUSCULAR; INTRAVENOUS at 09:18

## 2022-07-28 RX ADMIN — METRONIDAZOLE 500 MG: 500 INJECTION, SOLUTION INTRAVENOUS at 09:55

## 2022-07-28 RX ADMIN — HYDROCODONE BITARTRATE AND ACETAMINOPHEN 1 TABLET: 7.5; 325 TABLET ORAL at 11:48

## 2022-07-28 RX ADMIN — ROCURONIUM BROMIDE 30 MG: 50 INJECTION INTRAVENOUS at 10:21

## 2022-07-28 RX ADMIN — ONDANSETRON 4 MG: 2 INJECTION INTRAMUSCULAR; INTRAVENOUS at 10:43

## 2022-07-28 RX ADMIN — FENTANYL CITRATE 25 MCG: 50 INJECTION INTRAMUSCULAR; INTRAVENOUS at 10:33

## 2022-07-28 RX ADMIN — PROPOFOL 200 MG: 10 INJECTION, EMULSION INTRAVENOUS at 10:21

## 2022-07-28 RX ADMIN — FAMOTIDINE 20 MG: 10 INJECTION INTRAVENOUS at 09:11

## 2022-07-28 RX ADMIN — SODIUM CHLORIDE, POTASSIUM CHLORIDE, SODIUM LACTATE AND CALCIUM CHLORIDE 9 ML/HR: 600; 310; 30; 20 INJECTION, SOLUTION INTRAVENOUS at 09:18

## 2022-07-28 RX ADMIN — SUGAMMADEX 400 MG: 100 INJECTION, SOLUTION INTRAVENOUS at 10:49

## 2022-07-28 RX ADMIN — FENTANYL CITRATE 25 MCG: 50 INJECTION INTRAMUSCULAR; INTRAVENOUS at 10:45

## 2022-07-28 RX ADMIN — FENTANYL CITRATE 50 MCG: 50 INJECTION INTRAMUSCULAR; INTRAVENOUS at 11:21

## 2022-07-28 RX ADMIN — FENTANYL CITRATE 50 MCG: 50 INJECTION INTRAMUSCULAR; INTRAVENOUS at 10:21

## 2022-07-28 RX ADMIN — LIDOCAINE HYDROCHLORIDE 100 MG: 20 INJECTION, SOLUTION INFILTRATION; PERINEURAL at 10:21

## 2022-07-28 RX ADMIN — GLYCOPYRROLATE 0.1 MG: 0.2 INJECTION INTRAMUSCULAR; INTRAVENOUS at 10:30

## 2022-07-28 RX ADMIN — DEXAMETHASONE SODIUM PHOSPHATE 8 MG: 10 INJECTION INTRAMUSCULAR; INTRAVENOUS at 10:30

## 2022-07-28 NOTE — ANESTHESIA PROCEDURE NOTES
Airway  Urgency: elective    Date/Time: 7/28/2022 10:23 AM  Airway not difficult    General Information and Staff    Patient location during procedure: OR  Anesthesiologist: Roland Marshall MD  CRNA/CAA: Sabrina Gutierres CRNA    Indications and Patient Condition  Indications for airway management: airway protection    Preoxygenated: yes  MILS not maintained throughout  Mask difficulty assessment: 1 - vent by mask    Final Airway Details  Final airway type: endotracheal airway      Successful airway: ETT  Cuffed: yes   Successful intubation technique: direct laryngoscopy  Facilitating devices/methods: intubating stylet  Blade: Josef  Blade size: 3  ETT size (mm): 7.0  Cormack-Lehane Classification: grade IIa - partial view of glottis  Placement verified by: chest auscultation and capnometry   Cuff volume (mL): 8  Measured from: gums  ETT/EBT to gums (cm): 21  Number of attempts at approach: 1  Assessment: lips, teeth, and gum same as pre-op and atraumatic intubation

## 2022-07-28 NOTE — ANESTHESIA POSTPROCEDURE EVALUATION
Patient: Laya Castro    Procedure Summary     Date: 07/28/22 Room / Location: [facility] OR  / [facility] MAIN OR    Anesthesia Start: 1012 Anesthesia Stop: 1105    Procedure: I&D OF PERIRECTAL ABSCESS, SUPERFICIAL FISTULOTOMY (N/A Rectum) Diagnosis:       Fistula, anal      (Fistula, anal [K60.3])    Surgeons: Stacy Hill MD Provider: Roland Marshall MD    Anesthesia Type: general ASA Status: 3          Anesthesia Type: general    Vitals  Vitals Value Taken Time   /66 07/28/22 1116   Temp 36.6 °C (97.9 °F) 07/28/22 1100   Pulse 95 07/28/22 1126   Resp 16 07/28/22 1105   SpO2 96 % 07/28/22 1126   Vitals shown include unvalidated device data.        Post Anesthesia Care and Evaluation    Patient location during evaluation: PACU  Patient participation: complete - patient participated  Level of consciousness: awake and alert  Pain management: adequate    Airway patency: patent  Anesthetic complications: No anesthetic complications    Cardiovascular status: acceptable  Respiratory status: acceptable  Hydration status: acceptable    Comments: --------------------            07/28/22               1105     --------------------   BP:       128/67     Pulse:      96       Resp:       16       Temp:                SpO2:      97%      --------------------

## 2022-07-28 NOTE — ANESTHESIA PREPROCEDURE EVALUATION
Anesthesia Evaluation     Patient summary reviewed and Nursing notes reviewed                Airway   Mallampati: II  TM distance: >3 FB  Neck ROM: limited  Large neck circumference and No difficulty expected  Dental    (+) edentulous    Pulmonary    (+) a smoker Current, asthma,sleep apnea,   Cardiovascular     ECG reviewed  Patient on routine beta blocker and Beta blocker given within 24 hours of surgery  Rhythm: regular  Rate: normal    (+) hyperlipidemia,       Neuro/Psych  (+) headaches, syncope, numbness, psychiatric history Depression, Bipolar and Schizophrenia, dementia,    GI/Hepatic/Renal/Endo    (+) morbid obesity, GERD,      Musculoskeletal     Abdominal    Substance History   (+) drug use     OB/GYN negative ob/gyn ROS         Other   arthritis,                    Anesthesia Plan    ASA 3     general     (BMI  CMAC available for GETA  Edentulous    Smoker    I have reviewed the patient's history with the patient and the chart, including all pertinent laboratory results and imaging. I have explained the risks of anesthesia including but not limited to dental damage, corneal abrasion, nerve injury, MI, stroke, and death. Questions asked and answered. Anesthetic plan discussed with patient and team as indicated. Patient expressed understanding of the above.  )  intravenous induction     Anesthetic plan, risks, benefits, and alternatives have been provided, discussed and informed consent has been obtained with: patient.        CODE STATUS:

## 2024-02-15 ENCOUNTER — OFFICE VISIT (OUTPATIENT)
Dept: ORTHOPEDIC SURGERY | Facility: CLINIC | Age: 39
End: 2024-02-15
Payer: MEDICAID

## 2024-02-15 VITALS — TEMPERATURE: 98.6 F | HEIGHT: 67 IN | BODY MASS INDEX: 41.64 KG/M2 | WEIGHT: 265.3 LBS

## 2024-02-15 DIAGNOSIS — S42.034G: Primary | ICD-10-CM

## 2024-02-15 DIAGNOSIS — M75.41 ROTATOR CUFF IMPINGEMENT SYNDROME OF RIGHT SHOULDER: ICD-10-CM

## 2024-02-15 DIAGNOSIS — M25.511 RIGHT SHOULDER PAIN, UNSPECIFIED CHRONICITY: ICD-10-CM

## 2024-02-19 ENCOUNTER — TELEPHONE (OUTPATIENT)
Dept: ORTHOPEDIC SURGERY | Facility: CLINIC | Age: 39
End: 2024-02-19
Payer: MEDICAID

## 2024-02-19 NOTE — TELEPHONE ENCOUNTER
Attempted to call [facility].   Call rang  2 times-office will not answer.    was unable to give info regarding getting images on disc.

## 2024-03-11 ENCOUNTER — PREP FOR SURGERY (OUTPATIENT)
Dept: OTHER | Facility: HOSPITAL | Age: 39
End: 2024-03-11
Payer: MEDICAID

## 2024-03-11 ENCOUNTER — OFFICE VISIT (OUTPATIENT)
Dept: ORTHOPEDIC SURGERY | Facility: CLINIC | Age: 39
End: 2024-03-11
Payer: MEDICAID

## 2024-03-11 VITALS — WEIGHT: 261.6 LBS | BODY MASS INDEX: 41.06 KG/M2 | TEMPERATURE: 98.6 F | HEIGHT: 67 IN

## 2024-03-11 DIAGNOSIS — S42.034G: Primary | ICD-10-CM

## 2024-03-11 PROCEDURE — 1159F MED LIST DOCD IN RCRD: CPT | Performed by: ORTHOPAEDIC SURGERY

## 2024-03-11 PROCEDURE — 99214 OFFICE O/P EST MOD 30 MIN: CPT | Performed by: ORTHOPAEDIC SURGERY

## 2024-03-11 PROCEDURE — 1160F RVW MEDS BY RX/DR IN RCRD: CPT | Performed by: ORTHOPAEDIC SURGERY
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
## 2024-03-12 ENCOUNTER — TELEPHONE (OUTPATIENT)
Dept: ORTHOPEDIC SURGERY | Facility: CLINIC | Age: 39
End: 2024-03-12

## 2024-03-12 PROBLEM — S42.034A CLOSED NONDISPLACED FRACTURE OF LATERAL END OF RIGHT CLAVICLE: Status: ACTIVE | Noted: 2024-03-11

## 2024-03-12 NOTE — TELEPHONE ENCOUNTER
Caller: Laya Castro    Relationship: Self    Best call back number: [phone]    What is the best time to reach you: N/A     Who are you requesting to speak with (clinical staff, provider,  specific staff member): CLINICAL     Do you know the name of the person who called: NO    What was the call regarding: PATIENT MISSED A CALL FROM THE OFFICE AND THINKS IS WAS REGARDING SCHEDULING SURGERY. DID NOT SEE ANY MESSAGES IS HER CHART AND SHE DID NOT CHECK TO SEE IF THERE WAS A VOICEMAIL.

## 2024-04-10 RX ORDER — TIZANIDINE 2 MG/1
2 TABLET ORAL NIGHTLY
COMMUNITY
Start: 2024-02-29

## 2024-04-10 RX ORDER — PERPHENAZINE 2 MG/1
1 TABLET ORAL 2 TIMES DAILY
COMMUNITY
Start: 2024-03-07 | End: 2024-05-05

## 2024-04-10 RX ORDER — ATORVASTATIN CALCIUM 20 MG/1
20 TABLET, FILM COATED ORAL NIGHTLY
COMMUNITY
Start: 2024-03-04

## 2024-04-10 RX ORDER — OLANZAPINE 10 MG/1
1 TABLET ORAL NIGHTLY
COMMUNITY
Start: 2024-03-07 | End: 2024-05-05

## 2024-04-10 RX ORDER — TOPIRAMATE 200 MG
1 TABLET ORAL NIGHTLY
COMMUNITY
Start: 2024-03-07 | End: 2024-05-05

## 2024-04-11 ENCOUNTER — HOSPITAL ENCOUNTER (OUTPATIENT)
Facility: HOSPITAL | Age: 39
Setting detail: HOSPITAL OUTPATIENT SURGERY
Discharge: HOME | End: 2024-04-11
Attending: ORTHOPAEDIC SURGERY | Admitting: ORTHOPAEDIC SURGERY
Payer: MEDICAID

## 2024-04-11 ENCOUNTER — ANESTHESIA EVENT (OUTPATIENT)
Dept: PERIOP | Facility: HOSPITAL | Age: 39
End: 2024-04-11
Payer: MEDICAID

## 2024-04-11 ENCOUNTER — ANESTHESIA (OUTPATIENT)
Dept: PERIOP | Facility: HOSPITAL | Age: 39
End: 2024-04-11
Payer: MEDICAID

## 2024-04-11 VITALS
HEART RATE: 54 BPM | DIASTOLIC BLOOD PRESSURE: 74 MMHG | OXYGEN SATURATION: 96 % | TEMPERATURE: 97.6 F | BODY MASS INDEX: 40.81 KG/M2 | HEIGHT: 67 IN | SYSTOLIC BLOOD PRESSURE: 119 MMHG | RESPIRATION RATE: 16 BRPM | WEIGHT: 260 LBS

## 2024-04-11 DIAGNOSIS — S42.034G: ICD-10-CM

## 2024-04-11 LAB
B-HCG UR QL: NEGATIVE
EXPIRATION DATE: NORMAL
INTERNAL NEGATIVE CONTROL: NEGATIVE
INTERNAL POSITIVE CONTROL: POSITIVE
Lab: NORMAL

## 2024-04-11 PROCEDURE — 25010000002 ONDANSETRON PER 1 MG

## 2024-04-11 PROCEDURE — 23480 REVISION OF COLLAR BONE: CPT | Performed by: ORTHOPAEDIC SURGERY

## 2024-04-11 PROCEDURE — 25010000002 PROPOFOL 200 MG/20ML EMULSION

## 2024-04-11 PROCEDURE — 81025 URINE PREGNANCY TEST: CPT | Performed by: ANESTHESIOLOGY

## 2024-04-11 PROCEDURE — 25010000002 HYDROMORPHONE PER 4 MG

## 2024-04-11 PROCEDURE — 25810000003 LACTATED RINGERS PER 1000 ML: Performed by: ANESTHESIOLOGY

## 2024-04-11 PROCEDURE — 25010000002 FENTANYL CITRATE (PF) 50 MCG/ML SOLUTION

## 2024-04-11 PROCEDURE — 25010000002 SUGAMMADEX 200 MG/2ML SOLUTION

## 2024-04-11 PROCEDURE — 25010000002 DEXAMETHASONE SODIUM PHOSPHATE 20 MG/5ML SOLUTION

## 2024-04-11 PROCEDURE — 25010000002 CEFAZOLIN PER 500 MG: Performed by: ORTHOPAEDIC SURGERY

## 2024-04-11 PROCEDURE — 25010000002 FENTANYL CITRATE (PF) 50 MCG/ML SOLUTION: Performed by: ANESTHESIOLOGY

## 2024-04-11 DEVICE — DEV CONTRL TISS STRATAFIXSPIRALMNCRYL PLSPS2 REV3/0 15CM: Type: IMPLANTABLE DEVICE | Site: SHOULDER | Status: FUNCTIONAL

## 2024-04-11 RX ORDER — EPHEDRINE SULFATE 50 MG/ML
5 INJECTION, SOLUTION INTRAVENOUS ONCE AS NEEDED
Status: DISCONTINUED | OUTPATIENT
Start: 2024-04-11 | End: 2024-04-11 | Stop reason: HOSPADM

## 2024-04-11 RX ORDER — FENTANYL CITRATE 50 UG/ML
50 INJECTION, SOLUTION INTRAMUSCULAR; INTRAVENOUS
Status: DISCONTINUED | OUTPATIENT
Start: 2024-04-11 | End: 2024-04-11 | Stop reason: HOSPADM

## 2024-04-11 RX ORDER — HYDROCODONE BITARTRATE AND ACETAMINOPHEN 7.5; 325 MG/1; MG/1
1 TABLET ORAL EVERY 4 HOURS PRN
Qty: 42 TABLET | Refills: 0 | Status: SHIPPED | OUTPATIENT
Start: 2024-04-11 | End: 2024-04-23 | Stop reason: HOSPADM

## 2024-04-11 RX ORDER — NALOXONE HCL 0.4 MG/ML
0.2 VIAL (ML) INJECTION AS NEEDED
Status: DISCONTINUED | OUTPATIENT
Start: 2024-04-11 | End: 2024-04-11 | Stop reason: HOSPADM

## 2024-04-11 RX ORDER — MIDAZOLAM HYDROCHLORIDE 1 MG/ML
1 INJECTION INTRAMUSCULAR; INTRAVENOUS
Status: DISCONTINUED | OUTPATIENT
Start: 2024-04-11 | End: 2024-04-11 | Stop reason: HOSPADM

## 2024-04-11 RX ORDER — ONDANSETRON 4 MG/1
4 TABLET, FILM COATED ORAL EVERY 8 HOURS PRN
Qty: 30 TABLET | Refills: 0 | Status: SHIPPED | OUTPATIENT
Start: 2024-04-11

## 2024-04-11 RX ORDER — IPRATROPIUM BROMIDE AND ALBUTEROL SULFATE 2.5; .5 MG/3ML; MG/3ML
3 SOLUTION RESPIRATORY (INHALATION) ONCE AS NEEDED
Status: DISCONTINUED | OUTPATIENT
Start: 2024-04-11 | End: 2024-04-11 | Stop reason: HOSPADM

## 2024-04-11 RX ORDER — SODIUM CHLORIDE 0.9 % (FLUSH) 0.9 %
3-10 SYRINGE (ML) INJECTION AS NEEDED
Status: DISCONTINUED | OUTPATIENT
Start: 2024-04-11 | End: 2024-04-11 | Stop reason: HOSPADM

## 2024-04-11 RX ORDER — PROPOFOL 10 MG/ML
INJECTION, EMULSION INTRAVENOUS AS NEEDED
Status: DISCONTINUED | OUTPATIENT
Start: 2024-04-11 | End: 2024-04-11 | Stop reason: SURG

## 2024-04-11 RX ORDER — HYDROMORPHONE HYDROCHLORIDE 1 MG/ML
0.5 INJECTION, SOLUTION INTRAMUSCULAR; INTRAVENOUS; SUBCUTANEOUS
Status: DISCONTINUED | OUTPATIENT
Start: 2024-04-11 | End: 2024-04-11 | Stop reason: HOSPADM

## 2024-04-11 RX ORDER — FLUMAZENIL 0.1 MG/ML
0.2 INJECTION INTRAVENOUS AS NEEDED
Status: DISCONTINUED | OUTPATIENT
Start: 2024-04-11 | End: 2024-04-11 | Stop reason: HOSPADM

## 2024-04-11 RX ORDER — LIDOCAINE HYDROCHLORIDE 10 MG/ML
0.5 INJECTION, SOLUTION INFILTRATION; PERINEURAL ONCE AS NEEDED
Status: DISCONTINUED | OUTPATIENT
Start: 2024-04-11 | End: 2024-04-11 | Stop reason: HOSPADM

## 2024-04-11 RX ORDER — SODIUM CHLORIDE 0.9 % (FLUSH) 0.9 %
3 SYRINGE (ML) INJECTION EVERY 12 HOURS SCHEDULED
Status: DISCONTINUED | OUTPATIENT
Start: 2024-04-11 | End: 2024-04-11 | Stop reason: HOSPADM

## 2024-04-11 RX ORDER — HYDRALAZINE HYDROCHLORIDE 20 MG/ML
5 INJECTION INTRAMUSCULAR; INTRAVENOUS
Status: DISCONTINUED | OUTPATIENT
Start: 2024-04-11 | End: 2024-04-11 | Stop reason: HOSPADM

## 2024-04-11 RX ORDER — DIPHENHYDRAMINE HYDROCHLORIDE 50 MG/ML
12.5 INJECTION INTRAMUSCULAR; INTRAVENOUS
Status: DISCONTINUED | OUTPATIENT
Start: 2024-04-11 | End: 2024-04-11 | Stop reason: HOSPADM

## 2024-04-11 RX ORDER — DEXAMETHASONE SODIUM PHOSPHATE 4 MG/ML
INJECTION, SOLUTION INTRA-ARTICULAR; INTRALESIONAL; INTRAMUSCULAR; INTRAVENOUS; SOFT TISSUE AS NEEDED
Status: DISCONTINUED | OUTPATIENT
Start: 2024-04-11 | End: 2024-04-11 | Stop reason: SURG

## 2024-04-11 RX ORDER — LIDOCAINE HYDROCHLORIDE 20 MG/ML
INJECTION, SOLUTION EPIDURAL; INFILTRATION; INTRACAUDAL; PERINEURAL AS NEEDED
Status: DISCONTINUED | OUTPATIENT
Start: 2024-04-11 | End: 2024-04-11 | Stop reason: SURG

## 2024-04-11 RX ORDER — HYDROCODONE BITARTRATE AND ACETAMINOPHEN 5; 325 MG/1; MG/1
1 TABLET ORAL ONCE AS NEEDED
Status: DISCONTINUED | OUTPATIENT
Start: 2024-04-11 | End: 2024-04-11 | Stop reason: HOSPADM

## 2024-04-11 RX ORDER — ROCURONIUM BROMIDE 10 MG/ML
INJECTION, SOLUTION INTRAVENOUS AS NEEDED
Status: DISCONTINUED | OUTPATIENT
Start: 2024-04-11 | End: 2024-04-11 | Stop reason: SURG

## 2024-04-11 RX ORDER — ONDANSETRON 2 MG/ML
4 INJECTION INTRAMUSCULAR; INTRAVENOUS ONCE AS NEEDED
Status: COMPLETED | OUTPATIENT
Start: 2024-04-11 | End: 2024-04-11

## 2024-04-11 RX ORDER — DOCUSATE SODIUM 100 MG/1
100 CAPSULE, LIQUID FILLED ORAL 2 TIMES DAILY
Qty: 60 CAPSULE | Refills: 0 | Status: SHIPPED | OUTPATIENT
Start: 2024-04-11

## 2024-04-11 RX ORDER — FAMOTIDINE 10 MG/ML
20 INJECTION, SOLUTION INTRAVENOUS ONCE
Status: DISCONTINUED | OUTPATIENT
Start: 2024-04-11 | End: 2024-04-11 | Stop reason: HOSPADM

## 2024-04-11 RX ORDER — FENTANYL CITRATE 50 UG/ML
50 INJECTION, SOLUTION INTRAMUSCULAR; INTRAVENOUS ONCE AS NEEDED
Status: COMPLETED | OUTPATIENT
Start: 2024-04-11 | End: 2024-04-11

## 2024-04-11 RX ORDER — ISOPROPYL ALCOHOL 70 ML/100ML
LIQUID TOPICAL AS NEEDED
Status: DISCONTINUED | OUTPATIENT
Start: 2024-04-11 | End: 2024-04-11 | Stop reason: HOSPADM

## 2024-04-11 RX ORDER — SODIUM CHLORIDE, SODIUM LACTATE, POTASSIUM CHLORIDE, CALCIUM CHLORIDE 600; 310; 30; 20 MG/100ML; MG/100ML; MG/100ML; MG/100ML
9 INJECTION, SOLUTION INTRAVENOUS CONTINUOUS
Status: DISCONTINUED | OUTPATIENT
Start: 2024-04-11 | End: 2024-04-11 | Stop reason: HOSPADM

## 2024-04-11 RX ORDER — LABETALOL HYDROCHLORIDE 5 MG/ML
5 INJECTION, SOLUTION INTRAVENOUS
Status: DISCONTINUED | OUTPATIENT
Start: 2024-04-11 | End: 2024-04-11 | Stop reason: HOSPADM

## 2024-04-11 RX ORDER — BUPIVACAINE HYDROCHLORIDE AND EPINEPHRINE 5; 5 MG/ML; UG/ML
INJECTION, SOLUTION PERINEURAL AS NEEDED
Status: DISCONTINUED | OUTPATIENT
Start: 2024-04-11 | End: 2024-04-11 | Stop reason: HOSPADM

## 2024-04-11 RX ORDER — OXYCODONE AND ACETAMINOPHEN 7.5; 325 MG/1; MG/1
1 TABLET ORAL EVERY 4 HOURS PRN
Status: DISCONTINUED | OUTPATIENT
Start: 2024-04-11 | End: 2024-04-11 | Stop reason: HOSPADM

## 2024-04-11 RX ORDER — ONDANSETRON 2 MG/ML
INJECTION INTRAMUSCULAR; INTRAVENOUS AS NEEDED
Status: DISCONTINUED | OUTPATIENT
Start: 2024-04-11 | End: 2024-04-11 | Stop reason: SURG

## 2024-04-11 RX ADMIN — FENTANYL CITRATE 50 MCG: 50 INJECTION, SOLUTION INTRAMUSCULAR; INTRAVENOUS at 15:04

## 2024-04-11 RX ADMIN — HYDROMORPHONE HYDROCHLORIDE 0.5 MG: 1 INJECTION, SOLUTION INTRAMUSCULAR; INTRAVENOUS; SUBCUTANEOUS at 15:54

## 2024-04-11 RX ADMIN — ONDANSETRON 4 MG: 2 INJECTION INTRAMUSCULAR; INTRAVENOUS at 14:07

## 2024-04-11 RX ADMIN — SODIUM CHLORIDE, POTASSIUM CHLORIDE, SODIUM LACTATE AND CALCIUM CHLORIDE 9 ML/HR: 600; 310; 30; 20 INJECTION, SOLUTION INTRAVENOUS at 13:42

## 2024-04-11 RX ADMIN — ONDANSETRON 4 MG: 2 INJECTION INTRAMUSCULAR; INTRAVENOUS at 15:40

## 2024-04-11 RX ADMIN — OXYCODONE AND ACETAMINOPHEN 1 TABLET: 7.5; 325 TABLET ORAL at 15:20

## 2024-04-11 RX ADMIN — SUGAMMADEX 200 MG: 100 INJECTION, SOLUTION INTRAVENOUS at 14:35

## 2024-04-11 RX ADMIN — ROCURONIUM BROMIDE 40 MG: 10 INJECTION, SOLUTION INTRAVENOUS at 13:53

## 2024-04-11 RX ADMIN — HYDROMORPHONE HYDROCHLORIDE 0.5 MG: 1 INJECTION, SOLUTION INTRAMUSCULAR; INTRAVENOUS; SUBCUTANEOUS at 15:35

## 2024-04-11 RX ADMIN — LIDOCAINE HYDROCHLORIDE 100 MG: 20 INJECTION, SOLUTION EPIDURAL; INFILTRATION; INTRACAUDAL; PERINEURAL at 13:53

## 2024-04-11 RX ADMIN — PROPOFOL 200 MG: 10 INJECTION, EMULSION INTRAVENOUS at 13:53

## 2024-04-11 RX ADMIN — FENTANYL CITRATE 50 MCG: 50 INJECTION, SOLUTION INTRAMUSCULAR; INTRAVENOUS at 13:53

## 2024-04-11 RX ADMIN — DEXAMETHASONE SODIUM PHOSPHATE 8 MG: 4 INJECTION, SOLUTION INTRAMUSCULAR; INTRAVENOUS at 14:05

## 2024-04-11 RX ADMIN — SODIUM CHLORIDE 2 G: 900 INJECTION INTRAVENOUS at 13:41

## 2024-04-11 RX ADMIN — FENTANYL CITRATE 50 MCG: 50 INJECTION, SOLUTION INTRAMUSCULAR; INTRAVENOUS at 15:20

## 2024-04-11 NOTE — BRIEF OP NOTE
RESECTION DISTAL CLAVICLE  Progress Note    Laya Castro  4/11/2024    Pre-op Diagnosis:   Closed nondisplaced fracture of acromial end of right clavicle with delayed healing, subsequent encounter [S42.034G]       Post-Op Diagnosis Codes:     * Closed nondisplaced fracture of acromial end of right clavicle with delayed healing, subsequent encounter [S42.034G]    Procedure/CPT® Codes:        Procedure(s):  Open excision of right distal clavicle nonunion              Surgeon(s):  Carlos Giang MD    Anesthesia: General    Staff:   Circulator: Radha Morales RN  Scrub Person: Taran Mckeon CSFA  Assistant: Jazzy Zarco CSA  Assistant: Jazzy Zarco CSA      Estimated Blood Loss: minimal    Urine Voided: * No values recorded between 4/11/2024 12:00 AM and 4/11/2024  2:03 PM *    Specimens:                          Drains: * No LDAs found *    Findings: see dictation        Complications: none    Assistant: Jazzy Zarco CSA  was responsible for performing the following activities: Retraction and their skilled assistance was necessary for the success of this case.    Carlos Giang MD     Date: 4/11/2024  Time: 8204

## 2024-04-11 NOTE — ANESTHESIA PROCEDURE NOTES
Airway  Urgency: elective    Date/Time: 4/11/2024 1:56 PM  Airway not difficult    General Information and Staff    Patient location during procedure: OR  Anesthesiologist: Guanakito Mo MD  CRNA/CAA: Kan Choudhary CRNA    Indications and Patient Condition  Indications for airway management: airway protection    Preoxygenated: yes  MILS maintained throughout  Mask difficulty assessment: 2 - vent by mask + OA or adjuvant +/- NMBA    Final Airway Details  Final airway type: endotracheal airway      Successful airway: ETT  Cuffed: yes   Successful intubation technique: direct laryngoscopy  Facilitating devices/methods: intubating stylet  Endotracheal tube insertion site: oral  Blade: Leon  Blade size: 2  ETT size (mm): 7.0  Cormack-Lehane Classification: grade I - full view of glottis  Placement verified by: chest auscultation and capnometry   Measured from: lips  ETT/EBT  to lips (cm): 21  Number of attempts at approach: 1  Assessment: lips, teeth, and gum same as pre-op and atraumatic intubation

## 2024-04-11 NOTE — DISCHARGE INSTRUCTIONS
*last pain pill at 3:20 PM*      Pendulum Exercises for Post Op Shoulder Surgery      Lean over with your good arm supported on a table or chair.  Relax the injured arm and allow it to hang straight down at your side.  Slowly begin to swing the relaxed arm back and forth.  Then swing it side to side.  Next, move it in a [place]. Now,  reverse the direction.        Generally, you should spend about 5 minutes doing this exercise.  It should be done 2-3 times daily or as directed by your physician.

## 2024-04-11 NOTE — OP NOTE
Orthopaedic Operative Note    Facility: [facility]    Patient: Laya Castro    Medical Record Number: [id number]    YOB: 1985    Dictating Surgeon: Carlos Giang M.D.*    Primary Care Physician: Naheed Gibbons APRN    Date of Operation: 4/11/2024    Pre-Operative Diagnosis:  Right distal clavicle nonunion    Post-Operative Diagnosis:  Right distal clavicle nonunion    Procedure Performed: Open excision of right distal clavicle nonunion    Surgeon: Carlos Giang MD     Assistant: Jazzy Zarco whose assistance was critical for help with patient positioning, suctioning and irrigation, retraction, manipulation of the extremity for insertion of the implants, wound closure and application of the bandages.  Her assistance was critical to the success of this case.      Anesthesia: General followed by local administration of cord percent bupivacaine with epinephrine    Complications: None.     Estimated Blood Loss: Less than 50 mL.     Implants: None    Specimens: * No orders in the log *    Brief Operative Indication: Ms. Castro had a right distal clavicle nonunion.  The risk, benefits and alternatives to open excision of the fragment were discussed with her in detail.  She had knowledge understanding and consented.    Description of the procedure in detail: The patient and operative site were identified in the preoperative holding area.  Surgical site was marked.  She elected to forego regional anesthesia.  She was taken the operating room.  Adequate general anesthesia was administered.  She was repositioned on the operating table in the modified beachchair position.  Her head and neck were placed in neutral alignment.  All bony prominences were carefully padded and protected.  Her right upper extremity was prepped and draped in the standard, sterile fashion.  The extremity was cleaned with alcohol.  A Hibiclens scrub was performed followed by prepping with 2  ChloraPrep's.  I allowed this to dry for 3 minutes before the drape procedure was carried out.    A timeout was taken.  Preoperative antibiotics were administered.  Following this, an approximately 3 cm incision was fashioned directly over the acromioclavicular joint.  Full-thickness skin flaps were developed.  I carefully dissected down to the acromioclavicular joint which was demarcated.  The nonunion was identified.  I carefully excised the fragment.  She had what appeared to be advanced acromioclavicular arthritis as well.  An oscillating saw was used to perform a distal clavicle excision removing approximately 1 cm of bone from the distal clavicle under direct visualization.  The cup was contoured posteriorly to minimize impingement on the scapular spine.  Following this, I confirmed that the acromioclavicular joint was completely decompressed.  No further pathology was noted.    The wound was irrigated with a liter of sterile saline.  I made sure there was good hemostasis.  The wound was then closed using Vicryl for the deep tissues, Vicryl for the subcutaneous tissues, Monocryl for the skin followed by Steri-Strips.  A sterile bandage was applied.  I infiltrated the wound with approximately 20 cc of quarter percent bupivacaine with epinephrine.  The drapes were withdrawn and her arm was placed in a sling.  She was awakened and taken to the recovery room in good condition.    Carlos Giang MD  04/11/24

## 2024-04-11 NOTE — ANESTHESIA PREPROCEDURE EVALUATION
Anesthesia Evaluation     Patient summary reviewed   no history of anesthetic complications:   NPO Solid Status: > 8 hours  NPO Liquid Status: > 2 hours           Airway   Mallampati: II  TM distance: >3 FB  Neck ROM: full  Possible difficult intubation and Large neck circumference  Dental    (+) edentulous    Pulmonary    (+) pulmonary embolism, a smoker Current, asthma,sleep apnea  (-) shortness of breath, recent URI  Cardiovascular     ECG reviewed  Patient on routine beta blocker and Beta blocker given within 24 hours of surgery  Rhythm: regular  Rate: normal    (+) DVT, hyperlipidemia      Neuro/Psych  (+) headaches, syncope, numbness (CTS, Facial nerve palsy, Sciatica), psychiatric history (Claustrophobia, Borderline, Schizoaffective Disorder) Anxiety, Depression, PTSD and Bipolar, dementia  (-) seizures, CVA  GI/Hepatic/Renal/Endo    (+) morbid obesity, GERD    Musculoskeletal     Abdominal    Substance History   (+) drug use     OB/GYN    (-)  Pregnant        Other   arthritis,                       Anesthesia Plan    ASA 3     general     intravenous induction     Anesthetic plan, risks, benefits, and alternatives have been provided, discussed and informed consent has been obtained with: patient.    Use of blood products discussed with patient .        CODE STATUS:

## 2024-04-11 NOTE — ANESTHESIA POSTPROCEDURE EVALUATION
"Patient: Laya Castro    Procedure Summary       Date: 04/11/24 Room / Location:  [facility]  [facility]    Anesthesia Start: 1345 Anesthesia Stop: 1457    Procedure: Open excision of right distal clavicle nonunion (Right: Shoulder) Diagnosis:       Closed nondisplaced fracture of acromial end of right clavicle with delayed healing, subsequent encounter      (Closed nondisplaced fracture of acromial end of right clavicle with delayed healing, subsequent encounter [S42.034G])    Surgeons: Carlos Giang MD Provider: Guanakito Mo MD    Anesthesia Type: general ASA Status: 3            Anesthesia Type: general    Vitals  Vitals Value Taken Time   /75 04/11/24 1600   Temp 36.4 °C (97.6 °F) 04/11/24 1455   Pulse 67 04/11/24 1607   Resp 15 04/11/24 1535   SpO2 91 % 04/11/24 1607   Vitals shown include unfiled device data.        Post Anesthesia Care and Evaluation    Patient location during evaluation: bedside  Patient participation: complete - patient participated  Level of consciousness: awake  Pain management: adequate    Airway patency: patent  Anesthetic complications: No anesthetic complications    Cardiovascular status: acceptable  Respiratory status: acceptable  Hydration status: acceptable    Comments: /74 (BP Location: Left arm, Patient Position: Lying)   Pulse 54   Temp 36.4 °C (97.6 °F) (Oral)   Resp 16   Ht 170.2 cm (67\")   Wt 118 kg (260 lb)   LMP  (Within Days) Comment: hcg neg  SpO2 96%   BMI 40.72 kg/m²     "
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
## 2024-04-15 RX ORDER — OXYCODONE AND ACETAMINOPHEN 7.5; 325 MG/1; MG/1
1 TABLET ORAL EVERY 4 HOURS PRN
Qty: 42 TABLET | Refills: 0 | Status: SHIPPED | OUTPATIENT
Start: 2024-04-15 | End: 2024-04-18 | Stop reason: SDUPTHER

## 2024-04-15 NOTE — TELEPHONE ENCOUNTER
Caller:  MIMI EL    Relationship: PATIENT     Best call back number:[phone]    What is your medical concern? UNCONTROLLED PAIN POST OP    How long has this issue been going on? SINCE 4.11.24    Is your provider already aware of this issue? NO    Have you been treated for this issue? NO    UNABLE TO WARM TRANSFER

## 2024-04-18 DIAGNOSIS — M75.41 ROTATOR CUFF IMPINGEMENT SYNDROME OF RIGHT SHOULDER: ICD-10-CM

## 2024-04-18 DIAGNOSIS — M25.511 RIGHT SHOULDER PAIN, UNSPECIFIED CHRONICITY: ICD-10-CM

## 2024-04-18 DIAGNOSIS — S42.034G: Primary | ICD-10-CM

## 2024-04-18 RX ORDER — OXYCODONE AND ACETAMINOPHEN 7.5; 325 MG/1; MG/1
1 TABLET ORAL EVERY 4 HOURS PRN
Qty: 42 TABLET | Refills: 0 | Status: ON HOLD | OUTPATIENT
Start: 2024-04-18

## 2024-04-18 RX ORDER — OXYCODONE AND ACETAMINOPHEN 7.5; 325 MG/1; MG/1
1 TABLET ORAL EVERY 4 HOURS PRN
Qty: 42 TABLET | Refills: 0 | Status: SHIPPED | OUTPATIENT
Start: 2024-04-18 | End: 2024-04-18 | Stop reason: SDUPTHER

## 2024-04-18 NOTE — TELEPHONE ENCOUNTER
Patient called and states the pharmacy did not have the Percocet sent to [facility] she is asking that is be resent to [facility] [address]

## 2024-04-20 ENCOUNTER — HOSPITAL ENCOUNTER (OUTPATIENT)
Facility: HOSPITAL | Age: 39
Setting detail: OBSERVATION
Discharge: HOME | End: 2024-04-23
Attending: EMERGENCY MEDICINE | Admitting: HOSPITALIST
Payer: MEDICAID

## 2024-04-20 ENCOUNTER — APPOINTMENT (OUTPATIENT)
Dept: GENERAL RADIOLOGY | Facility: HOSPITAL | Age: 39
End: 2024-04-20
Payer: MEDICAID

## 2024-04-20 ENCOUNTER — APPOINTMENT (OUTPATIENT)
Dept: CT IMAGING | Facility: HOSPITAL | Age: 39
End: 2024-04-20
Payer: MEDICAID

## 2024-04-20 DIAGNOSIS — R52 INTRACTABLE PAIN: Primary | ICD-10-CM

## 2024-04-20 LAB
ALBUMIN SERPL-MCNC: 4.1 G/DL (ref 3.5–5.2)
ALBUMIN/GLOB SERPL: 1.5 G/DL
ALP SERPL-CCNC: 89 U/L (ref 39–117)
ALT SERPL W P-5'-P-CCNC: 51 U/L (ref 1–33)
ANION GAP SERPL CALCULATED.3IONS-SCNC: 9 MMOL/L (ref 5–15)
AST SERPL-CCNC: 33 U/L (ref 1–32)
BASOPHILS # BLD AUTO: 0.05 10*3/MM3 (ref 0–0.2)
BASOPHILS NFR BLD AUTO: 0.6 % (ref 0–1.5)
BILIRUB SERPL-MCNC: <0.2 MG/DL (ref 0–1.2)
BUN SERPL-MCNC: 11 MG/DL (ref 6–20)
BUN/CREAT SERPL: 15.1 (ref 7–25)
CALCIUM SPEC-SCNC: 9 MG/DL (ref 8.6–10.5)
CHLORIDE SERPL-SCNC: 108 MMOL/L (ref 98–107)
CO2 SERPL-SCNC: 25 MMOL/L (ref 22–29)
CREAT SERPL-MCNC: 0.73 MG/DL (ref 0.57–1)
DEPRECATED RDW RBC AUTO: 49.2 FL (ref 37–54)
EGFRCR SERPLBLD CKD-EPI 2021: 108.8 ML/MIN/1.73
EOSINOPHIL # BLD AUTO: 0.33 10*3/MM3 (ref 0–0.4)
EOSINOPHIL NFR BLD AUTO: 4.2 % (ref 0.3–6.2)
ERYTHROCYTE [DISTWIDTH] IN BLOOD BY AUTOMATED COUNT: 13.2 % (ref 12.3–15.4)
GLOBULIN UR ELPH-MCNC: 2.7 GM/DL
GLUCOSE SERPL-MCNC: 113 MG/DL (ref 65–99)
HCG SERPL QL: NEGATIVE
HCT VFR BLD AUTO: 44.3 % (ref 34–46.6)
HGB BLD-MCNC: 14.5 G/DL (ref 12–15.9)
IMM GRANULOCYTES # BLD AUTO: 0.02 10*3/MM3 (ref 0–0.05)
IMM GRANULOCYTES NFR BLD AUTO: 0.3 % (ref 0–0.5)
LYMPHOCYTES # BLD AUTO: 2.62 10*3/MM3 (ref 0.7–3.1)
LYMPHOCYTES NFR BLD AUTO: 33.6 % (ref 19.6–45.3)
MCH RBC QN AUTO: 32.7 PG (ref 26.6–33)
MCHC RBC AUTO-ENTMCNC: 32.7 G/DL (ref 31.5–35.7)
MCV RBC AUTO: 100 FL (ref 79–97)
MONOCYTES # BLD AUTO: 0.47 10*3/MM3 (ref 0.1–0.9)
MONOCYTES NFR BLD AUTO: 6 % (ref 5–12)
NEUTROPHILS NFR BLD AUTO: 4.3 10*3/MM3 (ref 1.7–7)
NEUTROPHILS NFR BLD AUTO: 55.3 % (ref 42.7–76)
NRBC BLD AUTO-RTO: 0 /100 WBC (ref 0–0.2)
PLATELET # BLD AUTO: 201 10*3/MM3 (ref 140–450)
PMV BLD AUTO: 11 FL (ref 6–12)
POTASSIUM SERPL-SCNC: 4.2 MMOL/L (ref 3.5–5.2)
PROT SERPL-MCNC: 6.8 G/DL (ref 6–8.5)
RBC # BLD AUTO: 4.43 10*6/MM3 (ref 3.77–5.28)
SODIUM SERPL-SCNC: 142 MMOL/L (ref 136–145)
WBC NRBC COR # BLD AUTO: 7.79 10*3/MM3 (ref 3.4–10.8)

## 2024-04-20 PROCEDURE — 96375 TX/PRO/DX INJ NEW DRUG ADDON: CPT

## 2024-04-20 PROCEDURE — 25010000002 MORPHINE PER 10 MG: Performed by: NURSE PRACTITIONER

## 2024-04-20 PROCEDURE — 25010000002 HYDROMORPHONE PER 4 MG: Performed by: NURSE PRACTITIONER

## 2024-04-20 PROCEDURE — 73030 X-RAY EXAM OF SHOULDER: CPT

## 2024-04-20 PROCEDURE — 96374 THER/PROPH/DIAG INJ IV PUSH: CPT

## 2024-04-20 PROCEDURE — 85025 COMPLETE CBC W/AUTO DIFF WBC: CPT | Performed by: NURSE PRACTITIONER

## 2024-04-20 PROCEDURE — 80053 COMPREHEN METABOLIC PANEL: CPT | Performed by: NURSE PRACTITIONER

## 2024-04-20 PROCEDURE — 72125 CT NECK SPINE W/O DYE: CPT

## 2024-04-20 PROCEDURE — 96361 HYDRATE IV INFUSION ADD-ON: CPT

## 2024-04-20 PROCEDURE — 25010000002 ONDANSETRON PER 1 MG: Performed by: NURSE PRACTITIONER

## 2024-04-20 PROCEDURE — 99285 EMERGENCY DEPT VISIT HI MDM: CPT

## 2024-04-20 PROCEDURE — G0378 HOSPITAL OBSERVATION PER HR: HCPCS

## 2024-04-20 PROCEDURE — 25810000003 SODIUM CHLORIDE 0.9 % SOLUTION: Performed by: NURSE PRACTITIONER

## 2024-04-20 PROCEDURE — 84703 CHORIONIC GONADOTROPIN ASSAY: CPT | Performed by: NURSE PRACTITIONER

## 2024-04-20 RX ORDER — MORPHINE SULFATE 2 MG/ML
4 INJECTION, SOLUTION INTRAMUSCULAR; INTRAVENOUS ONCE
Status: COMPLETED | OUTPATIENT
Start: 2024-04-20 | End: 2024-04-20

## 2024-04-20 RX ORDER — SODIUM CHLORIDE 0.9 % (FLUSH) 0.9 %
10 SYRINGE (ML) INJECTION AS NEEDED
Status: DISCONTINUED | OUTPATIENT
Start: 2024-04-20 | End: 2024-04-23 | Stop reason: HOSPADM

## 2024-04-20 RX ORDER — ONDANSETRON 2 MG/ML
4 INJECTION INTRAMUSCULAR; INTRAVENOUS ONCE
Status: COMPLETED | OUTPATIENT
Start: 2024-04-20 | End: 2024-04-20

## 2024-04-20 RX ORDER — HYDROMORPHONE HYDROCHLORIDE 1 MG/ML
0.5 INJECTION, SOLUTION INTRAMUSCULAR; INTRAVENOUS; SUBCUTANEOUS ONCE
Status: COMPLETED | OUTPATIENT
Start: 2024-04-20 | End: 2024-04-20

## 2024-04-20 RX ADMIN — SODIUM CHLORIDE 1000 ML: 9 INJECTION, SOLUTION INTRAVENOUS at 20:54

## 2024-04-20 RX ADMIN — HYDROMORPHONE HYDROCHLORIDE 0.5 MG: 1 INJECTION, SOLUTION INTRAMUSCULAR; INTRAVENOUS; SUBCUTANEOUS at 22:11

## 2024-04-20 RX ADMIN — ONDANSETRON 4 MG: 2 INJECTION INTRAMUSCULAR; INTRAVENOUS at 20:54

## 2024-04-20 RX ADMIN — MORPHINE SULFATE 4 MG: 2 INJECTION, SOLUTION INTRAMUSCULAR; INTRAVENOUS at 20:54

## 2024-04-20 NOTE — ED TRIAGE NOTES
PT to ER via EMS with complaints of right arm pain. PT states she had right shoulder surgery about 2 weeks ago and has had increased pain in the right arm across the back and in her neck. PT states the pain medication isn't helping her. PT is alert and oriented.

## 2024-04-21 ENCOUNTER — APPOINTMENT (OUTPATIENT)
Dept: MRI IMAGING | Facility: HOSPITAL | Age: 39
End: 2024-04-21
Payer: MEDICAID

## 2024-04-21 PROBLEM — R52 INTRACTABLE PAIN: Status: ACTIVE | Noted: 2024-04-21

## 2024-04-21 LAB
ANION GAP SERPL CALCULATED.3IONS-SCNC: 8 MMOL/L (ref 5–15)
BASOPHILS # BLD AUTO: 0.04 10*3/MM3 (ref 0–0.2)
BASOPHILS NFR BLD AUTO: 0.6 % (ref 0–1.5)
BUN SERPL-MCNC: 10 MG/DL (ref 6–20)
BUN/CREAT SERPL: 14.1 (ref 7–25)
CALCIUM SPEC-SCNC: 8.6 MG/DL (ref 8.6–10.5)
CHLORIDE SERPL-SCNC: 106 MMOL/L (ref 98–107)
CHOLEST SERPL-MCNC: 162 MG/DL (ref 0–200)
CO2 SERPL-SCNC: 26 MMOL/L (ref 22–29)
CREAT SERPL-MCNC: 0.71 MG/DL (ref 0.57–1)
DEPRECATED RDW RBC AUTO: 43.6 FL (ref 37–54)
EGFRCR SERPLBLD CKD-EPI 2021: 112.5 ML/MIN/1.73
EOSINOPHIL # BLD AUTO: 0.32 10*3/MM3 (ref 0–0.4)
EOSINOPHIL NFR BLD AUTO: 5 % (ref 0.3–6.2)
ERYTHROCYTE [DISTWIDTH] IN BLOOD BY AUTOMATED COUNT: 12.3 % (ref 12.3–15.4)
GLUCOSE SERPL-MCNC: 99 MG/DL (ref 65–99)
HBA1C MFR BLD: 5.5 % (ref 4.8–5.6)
HCT VFR BLD AUTO: 39.9 % (ref 34–46.6)
HDLC SERPL-MCNC: 35 MG/DL (ref 40–60)
HGB BLD-MCNC: 13.1 G/DL (ref 12–15.9)
IMM GRANULOCYTES # BLD AUTO: 0.01 10*3/MM3 (ref 0–0.05)
IMM GRANULOCYTES NFR BLD AUTO: 0.2 % (ref 0–0.5)
LDLC SERPL CALC-MCNC: 97 MG/DL (ref 0–100)
LDLC/HDLC SERPL: 2.63 {RATIO}
LYMPHOCYTES # BLD AUTO: 2.46 10*3/MM3 (ref 0.7–3.1)
LYMPHOCYTES NFR BLD AUTO: 38.5 % (ref 19.6–45.3)
MCH RBC QN AUTO: 31.9 PG (ref 26.6–33)
MCHC RBC AUTO-ENTMCNC: 32.8 G/DL (ref 31.5–35.7)
MCV RBC AUTO: 97.1 FL (ref 79–97)
MONOCYTES # BLD AUTO: 0.37 10*3/MM3 (ref 0.1–0.9)
MONOCYTES NFR BLD AUTO: 5.8 % (ref 5–12)
NEUTROPHILS NFR BLD AUTO: 3.19 10*3/MM3 (ref 1.7–7)
NEUTROPHILS NFR BLD AUTO: 49.9 % (ref 42.7–76)
NRBC BLD AUTO-RTO: 0 /100 WBC (ref 0–0.2)
PLATELET # BLD AUTO: 179 10*3/MM3 (ref 140–450)
PMV BLD AUTO: 10.8 FL (ref 6–12)
POTASSIUM SERPL-SCNC: 3.9 MMOL/L (ref 3.5–5.2)
RBC # BLD AUTO: 4.11 10*6/MM3 (ref 3.77–5.28)
SODIUM SERPL-SCNC: 140 MMOL/L (ref 136–145)
TRIGL SERPL-MCNC: 174 MG/DL (ref 0–150)
VLDLC SERPL-MCNC: 30 MG/DL (ref 5–40)
WBC NRBC COR # BLD AUTO: 6.39 10*3/MM3 (ref 3.4–10.8)

## 2024-04-21 PROCEDURE — A9577 INJ MULTIHANCE: HCPCS | Performed by: HOSPITALIST

## 2024-04-21 PROCEDURE — G0378 HOSPITAL OBSERVATION PER HR: HCPCS

## 2024-04-21 PROCEDURE — 80061 LIPID PANEL: CPT | Performed by: HOSPITALIST

## 2024-04-21 PROCEDURE — 63710000001 DIPHENHYDRAMINE PER 50 MG: Performed by: HOSPITALIST

## 2024-04-21 PROCEDURE — 25010000002 HYDROMORPHONE 1 MG/ML SOLUTION: Performed by: NURSE PRACTITIONER

## 2024-04-21 PROCEDURE — 25010000002 ONDANSETRON PER 1 MG: Performed by: NURSE PRACTITIONER

## 2024-04-21 PROCEDURE — 25010000002 ONDANSETRON PER 1 MG: Performed by: HOSPITALIST

## 2024-04-21 PROCEDURE — 83036 HEMOGLOBIN GLYCOSYLATED A1C: CPT | Performed by: HOSPITALIST

## 2024-04-21 PROCEDURE — 96376 TX/PRO/DX INJ SAME DRUG ADON: CPT

## 2024-04-21 PROCEDURE — 96375 TX/PRO/DX INJ NEW DRUG ADDON: CPT

## 2024-04-21 PROCEDURE — 72141 MRI NECK SPINE W/O DYE: CPT

## 2024-04-21 PROCEDURE — 85025 COMPLETE CBC W/AUTO DIFF WBC: CPT | Performed by: NURSE PRACTITIONER

## 2024-04-21 PROCEDURE — 0 GADOBENATE DIMEGLUMINE 529 MG/ML SOLUTION: Performed by: HOSPITALIST

## 2024-04-21 PROCEDURE — 36415 COLL VENOUS BLD VENIPUNCTURE: CPT | Performed by: NURSE PRACTITIONER

## 2024-04-21 PROCEDURE — 25010000002 KETOROLAC TROMETHAMINE PER 15 MG: Performed by: HOSPITALIST

## 2024-04-21 PROCEDURE — 73220 MRI UPPR EXTREMITY W/O&W/DYE: CPT

## 2024-04-21 PROCEDURE — 80048 BASIC METABOLIC PNL TOTAL CA: CPT | Performed by: NURSE PRACTITIONER

## 2024-04-21 RX ORDER — SODIUM CHLORIDE 9 MG/ML
40 INJECTION, SOLUTION INTRAVENOUS AS NEEDED
Status: DISCONTINUED | OUTPATIENT
Start: 2024-04-21 | End: 2024-04-23 | Stop reason: HOSPADM

## 2024-04-21 RX ORDER — KETOROLAC TROMETHAMINE 15 MG/ML
15 INJECTION, SOLUTION INTRAMUSCULAR; INTRAVENOUS EVERY 6 HOURS PRN
Status: DISCONTINUED | OUTPATIENT
Start: 2024-04-21 | End: 2024-04-23 | Stop reason: HOSPADM

## 2024-04-21 RX ORDER — DOCUSATE SODIUM 100 MG/1
100 CAPSULE, LIQUID FILLED ORAL 2 TIMES DAILY
Status: DISCONTINUED | OUTPATIENT
Start: 2024-04-21 | End: 2024-04-23 | Stop reason: HOSPADM

## 2024-04-21 RX ORDER — TRAZODONE HYDROCHLORIDE 100 MG/1
300 TABLET ORAL NIGHTLY
Status: DISCONTINUED | OUTPATIENT
Start: 2024-04-21 | End: 2024-04-23 | Stop reason: HOSPADM

## 2024-04-21 RX ORDER — OXYCODONE AND ACETAMINOPHEN 7.5; 325 MG/1; MG/1
1 TABLET ORAL EVERY 4 HOURS PRN
Status: DISCONTINUED | OUTPATIENT
Start: 2024-04-21 | End: 2024-04-22

## 2024-04-21 RX ORDER — ACETAMINOPHEN 160 MG/5ML
650 SOLUTION ORAL EVERY 4 HOURS PRN
Status: DISCONTINUED | OUTPATIENT
Start: 2024-04-21 | End: 2024-04-23 | Stop reason: HOSPADM

## 2024-04-21 RX ORDER — ONDANSETRON 4 MG/1
4 TABLET, ORALLY DISINTEGRATING ORAL EVERY 6 HOURS PRN
Status: DISCONTINUED | OUTPATIENT
Start: 2024-04-21 | End: 2024-04-23 | Stop reason: HOSPADM

## 2024-04-21 RX ORDER — TOPIRAMATE 100 MG/1
200 TABLET, FILM COATED ORAL NIGHTLY
Status: DISCONTINUED | OUTPATIENT
Start: 2024-04-21 | End: 2024-04-23 | Stop reason: HOSPADM

## 2024-04-21 RX ORDER — CYCLOBENZAPRINE HCL 10 MG
10 TABLET ORAL 3 TIMES DAILY
Status: DISCONTINUED | OUTPATIENT
Start: 2024-04-21 | End: 2024-04-21

## 2024-04-21 RX ORDER — BISACODYL 5 MG/1
5 TABLET, DELAYED RELEASE ORAL DAILY PRN
Status: DISCONTINUED | OUTPATIENT
Start: 2024-04-21 | End: 2024-04-23 | Stop reason: HOSPADM

## 2024-04-21 RX ORDER — SODIUM CHLORIDE 0.9 % (FLUSH) 0.9 %
10 SYRINGE (ML) INJECTION AS NEEDED
Status: DISCONTINUED | OUTPATIENT
Start: 2024-04-21 | End: 2024-04-23 | Stop reason: HOSPADM

## 2024-04-21 RX ORDER — DIAZEPAM 5 MG/1
5 TABLET ORAL ONCE AS NEEDED
Status: COMPLETED | OUTPATIENT
Start: 2024-04-21 | End: 2024-04-21

## 2024-04-21 RX ORDER — POLYETHYLENE GLYCOL 3350 17 G/17G
17 POWDER, FOR SOLUTION ORAL DAILY PRN
Status: DISCONTINUED | OUTPATIENT
Start: 2024-04-21 | End: 2024-04-23 | Stop reason: HOSPADM

## 2024-04-21 RX ORDER — ACETAMINOPHEN 650 MG/1
650 SUPPOSITORY RECTAL EVERY 4 HOURS PRN
Status: DISCONTINUED | OUTPATIENT
Start: 2024-04-21 | End: 2024-04-23 | Stop reason: HOSPADM

## 2024-04-21 RX ORDER — OLANZAPINE 10 MG/1
10 TABLET ORAL NIGHTLY
Status: DISCONTINUED | OUTPATIENT
Start: 2024-04-21 | End: 2024-04-23 | Stop reason: HOSPADM

## 2024-04-21 RX ORDER — CYCLOBENZAPRINE HCL 10 MG
10 TABLET ORAL 3 TIMES DAILY
Status: DISCONTINUED | OUTPATIENT
Start: 2024-04-21 | End: 2024-04-23 | Stop reason: HOSPADM

## 2024-04-21 RX ORDER — DIPHENHYDRAMINE HCL 25 MG
25 CAPSULE ORAL EVERY 6 HOURS
Status: DISCONTINUED | OUTPATIENT
Start: 2024-04-21 | End: 2024-04-23 | Stop reason: HOSPADM

## 2024-04-21 RX ORDER — ONDANSETRON 2 MG/ML
4 INJECTION INTRAMUSCULAR; INTRAVENOUS EVERY 6 HOURS PRN
Status: DISCONTINUED | OUTPATIENT
Start: 2024-04-21 | End: 2024-04-23 | Stop reason: HOSPADM

## 2024-04-21 RX ORDER — ACETAMINOPHEN 325 MG/1
650 TABLET ORAL EVERY 4 HOURS PRN
Status: DISCONTINUED | OUTPATIENT
Start: 2024-04-21 | End: 2024-04-23 | Stop reason: HOSPADM

## 2024-04-21 RX ORDER — BISACODYL 10 MG
10 SUPPOSITORY, RECTAL RECTAL DAILY PRN
Status: DISCONTINUED | OUTPATIENT
Start: 2024-04-21 | End: 2024-04-23 | Stop reason: HOSPADM

## 2024-04-21 RX ORDER — HYDROCODONE BITARTRATE AND ACETAMINOPHEN 7.5; 325 MG/1; MG/1
1 TABLET ORAL EVERY 4 HOURS PRN
Status: DISCONTINUED | OUTPATIENT
Start: 2024-04-21 | End: 2024-04-22

## 2024-04-21 RX ORDER — BUSPIRONE HYDROCHLORIDE 10 MG/1
10 TABLET ORAL 3 TIMES DAILY
Status: DISCONTINUED | OUTPATIENT
Start: 2024-04-21 | End: 2024-04-23 | Stop reason: HOSPADM

## 2024-04-21 RX ORDER — SODIUM CHLORIDE 0.9 % (FLUSH) 0.9 %
10 SYRINGE (ML) INJECTION EVERY 12 HOURS SCHEDULED
Status: DISCONTINUED | OUTPATIENT
Start: 2024-04-21 | End: 2024-04-23 | Stop reason: HOSPADM

## 2024-04-21 RX ORDER — METOPROLOL SUCCINATE 25 MG/1
25 TABLET, EXTENDED RELEASE ORAL DAILY
Status: DISCONTINUED | OUTPATIENT
Start: 2024-04-21 | End: 2024-04-23 | Stop reason: HOSPADM

## 2024-04-21 RX ORDER — FLUTICASONE PROPIONATE 50 MCG
1 SPRAY, SUSPENSION (ML) NASAL DAILY
Status: DISCONTINUED | OUTPATIENT
Start: 2024-04-21 | End: 2024-04-23 | Stop reason: HOSPADM

## 2024-04-21 RX ORDER — AMOXICILLIN 250 MG
2 CAPSULE ORAL 2 TIMES DAILY PRN
Status: DISCONTINUED | OUTPATIENT
Start: 2024-04-21 | End: 2024-04-23 | Stop reason: HOSPADM

## 2024-04-21 RX ORDER — LANOLIN ALCOHOL/MO/W.PET/CERES
50 CREAM (GRAM) TOPICAL DAILY
Status: DISCONTINUED | OUTPATIENT
Start: 2024-04-21 | End: 2024-04-23 | Stop reason: HOSPADM

## 2024-04-21 RX ORDER — ATORVASTATIN CALCIUM 20 MG/1
20 TABLET, FILM COATED ORAL NIGHTLY
Status: DISCONTINUED | OUTPATIENT
Start: 2024-04-21 | End: 2024-04-23 | Stop reason: HOSPADM

## 2024-04-21 RX ORDER — PERPHENAZINE 2 MG/1
2 TABLET ORAL 2 TIMES DAILY
Status: DISCONTINUED | OUTPATIENT
Start: 2024-04-21 | End: 2024-04-23 | Stop reason: HOSPADM

## 2024-04-21 RX ORDER — TIZANIDINE 4 MG/1
2 TABLET ORAL NIGHTLY
Status: DISCONTINUED | OUTPATIENT
Start: 2024-04-21 | End: 2024-04-23 | Stop reason: HOSPADM

## 2024-04-21 RX ORDER — ALBUTEROL SULFATE 2.5 MG/3ML
2.5 SOLUTION RESPIRATORY (INHALATION) EVERY 6 HOURS PRN
Status: DISCONTINUED | OUTPATIENT
Start: 2024-04-21 | End: 2024-04-23 | Stop reason: HOSPADM

## 2024-04-21 RX ADMIN — BUSPIRONE HYDROCHLORIDE 10 MG: 10 TABLET ORAL at 21:35

## 2024-04-21 RX ADMIN — OXYCODONE AND ACETAMINOPHEN 1 TABLET: 7.5; 325 TABLET ORAL at 05:43

## 2024-04-21 RX ADMIN — ONDANSETRON 4 MG: 2 INJECTION INTRAMUSCULAR; INTRAVENOUS at 21:44

## 2024-04-21 RX ADMIN — DIPHENHYDRAMINE HYDROCHLORIDE 25 MG: 25 CAPSULE ORAL at 20:28

## 2024-04-21 RX ADMIN — HYDROMORPHONE HYDROCHLORIDE 1 MG: 1 INJECTION, SOLUTION INTRAMUSCULAR; INTRAVENOUS; SUBCUTANEOUS at 00:10

## 2024-04-21 RX ADMIN — Medication 50 MG: at 09:58

## 2024-04-21 RX ADMIN — Medication 10 ML: at 02:07

## 2024-04-21 RX ADMIN — Medication 10 ML: at 08:33

## 2024-04-21 RX ADMIN — BUSPIRONE HYDROCHLORIDE 10 MG: 10 TABLET ORAL at 15:30

## 2024-04-21 RX ADMIN — TIZANIDINE 2 MG: 4 TABLET ORAL at 21:37

## 2024-04-21 RX ADMIN — TRAZODONE HYDROCHLORIDE 300 MG: 100 TABLET ORAL at 21:37

## 2024-04-21 RX ADMIN — DIPHENHYDRAMINE HYDROCHLORIDE 25 MG: 25 CAPSULE ORAL at 15:30

## 2024-04-21 RX ADMIN — DIAZEPAM 5 MG: 5 TABLET ORAL at 12:18

## 2024-04-21 RX ADMIN — HYDROCODONE BITARTRATE AND ACETAMINOPHEN 1 TABLET: 7.5; 325 TABLET ORAL at 15:38

## 2024-04-21 RX ADMIN — KETOROLAC TROMETHAMINE 15 MG: 15 INJECTION, SOLUTION INTRAMUSCULAR; INTRAVENOUS at 21:45

## 2024-04-21 RX ADMIN — DOCUSATE SODIUM 100 MG: 100 CAPSULE, LIQUID FILLED ORAL at 08:53

## 2024-04-21 RX ADMIN — ONDANSETRON 4 MG: 2 INJECTION INTRAMUSCULAR; INTRAVENOUS at 04:55

## 2024-04-21 RX ADMIN — CYCLOBENZAPRINE 10 MG: 10 TABLET, FILM COATED ORAL at 15:30

## 2024-04-21 RX ADMIN — ATORVASTATIN CALCIUM 20 MG: 20 TABLET, FILM COATED ORAL at 21:50

## 2024-04-21 RX ADMIN — METOPROLOL SUCCINATE 25 MG: 25 TABLET, EXTENDED RELEASE ORAL at 08:54

## 2024-04-21 RX ADMIN — CYCLOBENZAPRINE 10 MG: 10 TABLET, FILM COATED ORAL at 21:44

## 2024-04-21 RX ADMIN — HYDROCODONE BITARTRATE AND ACETAMINOPHEN 1 TABLET: 7.5; 325 TABLET ORAL at 20:28

## 2024-04-21 RX ADMIN — Medication 10 ML: at 21:44

## 2024-04-21 RX ADMIN — GADOBENATE DIMEGLUMINE 20 ML: 529 INJECTION, SOLUTION INTRAVENOUS at 14:05

## 2024-04-21 RX ADMIN — OXYCODONE AND ACETAMINOPHEN 1 TABLET: 7.5; 325 TABLET ORAL at 09:58

## 2024-04-21 RX ADMIN — OLANZAPINE 10 MG: 10 TABLET, FILM COATED ORAL at 21:44

## 2024-04-21 RX ADMIN — TOPIRAMATE 200 MG: 100 TABLET, FILM COATED ORAL at 21:37

## 2024-04-21 RX ADMIN — FLUTICASONE PROPIONATE 1 SPRAY: 50 SPRAY, METERED NASAL at 08:54

## 2024-04-21 RX ADMIN — DIPHENHYDRAMINE HYDROCHLORIDE 25 MG: 25 CAPSULE ORAL at 08:53

## 2024-04-21 RX ADMIN — CYCLOBENZAPRINE 10 MG: 10 TABLET, FILM COATED ORAL at 08:53

## 2024-04-21 RX ADMIN — BUSPIRONE HYDROCHLORIDE 10 MG: 10 TABLET ORAL at 08:54

## 2024-04-21 RX ADMIN — DOCUSATE SODIUM 100 MG: 100 CAPSULE, LIQUID FILLED ORAL at 21:44

## 2024-04-21 RX ADMIN — PERPHENAZINE 2 MG: 2 TABLET, FILM COATED ORAL at 21:37

## 2024-04-21 NOTE — NURSING NOTE
Called for a orthopedic surgery consult, spoke with Hien REDDY we have to call DR Dorsey the surgeon who did the procedure tomorrow morning 04.22.24 will pass the message to the coming nurse

## 2024-04-21 NOTE — SIGNIFICANT NOTE
04/21/24 1448   OTHER   Discipline physical therapist   Rehab Time/Intention   Session Not Performed patient unavailable for evaluation  (Pt off floor for MRI. Will f/u tomorrow)   Recommendation   PT - Next Appointment 04/22/24
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
## 2024-04-21 NOTE — ED NOTES
Nursing report ED to floor  Laya Castro  38 y.o.  female    HPI :  HPI (Adult)  Stated Reason for Visit: right arm pain radiating to back and neck, PT recently had right shoulder surgery.  History Obtained From: patient, EMS    Chief Complaint  Chief Complaint   Patient presents with    Arm Pain    Neck Pain       Admitting doctor:   London Russo MD    Admitting diagnosis:   The encounter diagnosis was Intractable pain.    Code status:   Current Code Status       Date Active Code Status Order ID Comments User Context       4/21/2024 0102 CPR (Attempt to Resuscitate) [id number]  Rubi Schaefer, APRN ED        Question Answer    Code Status (Patient has no pulse and is not breathing) CPR (Attempt to Resuscitate)    Medical Interventions (Patient has pulse or is breathing) Full Support                    Allergies:   Haloperidol, Promethazine, Shellfish-derived products, Adhesive tape, Diphenhydramine-acetaminophen, and Fish-derived products    Isolation:   No active isolations    Intake and Output  No intake or output data in the 24 hours ending 04/21/24 0108    Weight:       04/20/24  1939   Weight: 116 kg (255 lb)       Most recent vitals:   Vitals:    04/20/24 2152 04/20/24 2209 04/20/24 2320 04/21/24 0009   BP: 94/65 115/88 (!) 89/66 103/71   BP Location: Left arm Left arm Left arm    Patient Position: Lying Lying Lying    Pulse: 99 100 75 86   Resp: 16 16 16    Temp:       TempSrc:       SpO2: 95% 93% 93% 92%   Weight:       Height:           Active LDAs/IV Access:   Lines, Drains & Airways       Active LDAs       Name Placement date Placement time Site Days    Peripheral IV 04/20/24 2039 Left Antecubital 04/20/24 2039  Antecubital  less than 1                    Labs (abnormal labs have a star):   Labs Reviewed   COMPREHENSIVE METABOLIC PANEL - Abnormal; Notable for the following components:       Result Value    Glucose 113 (*)     Chloride 108 (*)     ALT (SGPT) 51 (*)     AST (SGOT) 33 (*)      All other components within normal limits    Narrative:     GFR Normal >60  Chronic Kidney Disease <60  Kidney Failure <15     CBC WITH AUTO DIFFERENTIAL - Abnormal; Notable for the following components:    .0 (*)     All other components within normal limits   HCG, SERUM, QUALITATIVE - Normal   BASIC METABOLIC PANEL   CBC WITH AUTO DIFFERENTIAL   CBC AND DIFFERENTIAL    Narrative:     The following orders were created for panel order CBC & Differential.  Procedure                               Abnormality         Status                     ---------                               -----------         ------                     CBC Auto Differential[[id number]]        Abnormal            Final result                 Please view results for these tests on the individual orders.       EKG:   No orders to display       Meds given in ED:   Medications   sodium chloride 0.9 % flush 10 mL (has no administration in time range)   sodium chloride 0.9 % flush 10 mL (has no administration in time range)   sodium chloride 0.9 % flush 10 mL (has no administration in time range)   sodium chloride 0.9 % infusion 40 mL (has no administration in time range)   acetaminophen (TYLENOL) tablet 650 mg (has no administration in time range)     Or   acetaminophen (TYLENOL) 160 MG/5ML oral solution 650 mg (has no administration in time range)     Or   acetaminophen (TYLENOL) suppository 650 mg (has no administration in time range)   sennosides-docusate (PERICOLACE) 8.6-50 MG per tablet 2 tablet (has no administration in time range)     And   polyethylene glycol (MIRALAX) packet 17 g (has no administration in time range)     And   bisacodyl (DULCOLAX) EC tablet 5 mg (has no administration in time range)     And   bisacodyl (DULCOLAX) suppository 10 mg (has no administration in time range)   ondansetron (ZOFRAN) injection 4 mg (has no administration in time range)   cyclobenzaprine (FLEXERIL) tablet 10 mg (has no administration in time  range)   oxyCODONE-acetaminophen (PERCOCET) 7.5-325 MG per tablet 1 tablet (has no administration in time range)   morphine injection 4 mg (4 mg Intravenous Given 4/20/24 2054)   ondansetron (ZOFRAN) injection 4 mg (4 mg Intravenous Given 4/20/24 2054)   sodium chloride 0.9 % bolus 1,000 mL (0 mL Intravenous Stopped 4/20/24 2151)   HYDROmorphone (DILAUDID) injection 0.5 mg (0.5 mg Intravenous Given 4/20/24 2211)   HYDROmorphone (DILAUDID) injection 1 mg (1 mg Intravenous Given 4/21/24 0010)       Imaging results:  CT Cervical Spine Without Contrast    Result Date: 4/20/2024  1.  Mild degenerative disc disease. 2.  No acute cervical spine fracture. 3.  Partially imaged mildly enlarged and prominent bilateral cervical lymph nodes, which are nonspecific. Recommend clinical assessment and follow-up, as well as short-term follow-up CT neck in 3 months to assess for resolution or stability.  This report was finalized on 4/20/2024 9:57 PM by Dr. Roxi Mata M.D on Workstation: [id number]       Ambulatory status:   - Self    Social issues:   Social History     Socioeconomic History    Marital status: Single   Tobacco Use    Smoking status: Every Day     Current packs/day: 1.00     Types: Cigarettes    Smokeless tobacco: Never   Vaping Use    Vaping status: Never Used    Passive vaping exposure: Yes   Substance and Sexual Activity    Alcohol use: Not Currently     Comment: occ.    Drug use: Yes     Types: Amphetamines, Cocaine(coke), Marijuana     Comment: smokes weed    Sexual activity: Defer       Peripheral Neurovascular  Peripheral Neurovascular (Adult)  Peripheral Neurovascular WDL: WDL, pulse assessment  Pulse Assessment: radial  RUE Neurovascular Assessment  Temperature RUE: warm    Neuro Cognitive  Neuro Cognitive (Adult)  Cognitive/Neuro/Behavioral WDL: WDL, level of consciousness, orientation  Level of Consciousness: Alert  Orientation: oriented x 4    Learning  Learning Assessment (Adult)  Learning Readiness  and Ability: no barriers identified    Respiratory  Respiratory WDL  Respiratory WDL: WDL  Rhythm/Pattern, Respiratory: depth regular, no shortness of breath reported, pattern regular, unlabored    Abdominal Pain       Pain Assessments  Pain (Adult)  (0-10) Pain Rating: Rest: 10  (0-10) Pain Rating: Activity: 10  Response to Pain Interventions: other (see comments) (Dilaudid lasted slightly longer than the morphine.)    NIH Stroke Scale       Michelle Palma RN  04/21/24 01:08 EDT

## 2024-04-21 NOTE — ED PROVIDER NOTES
MD ATTESTATION NOTE  I supervised care provided by the midlevel provider. We have discussed this patient's history, physical exam, and treatment plan. I have reviewed the midlevel provider's note and I agree with the midlevel provider's findings and plan of care.   SHARED VISIT: This visit was performed by BOTH a physician and an APC. The substantive portion of the medical decision making was performed by this attesting physician who made or approved the management plan and takes responsibility for patient management. All studies in the APC note (if performed) were independently interpreted by me.   I have personally had a face to face encounter with the patient.   See my attending note.    PCP: Naheed Gibbons APRN  Patient Care Team:  Naheed Gibbons APRN as PCP - General (Family Medicine)  Mariama Wilder PA-C as Physician Assistant (Colon and Rectal Surgery)     Laya Castro is a 38 y.o. female who presents to the ED c/o right arm pain.  Patient reports that she had clavicle surgery, has been having pain since.  Patient complains of pain in her right neck right shoulder and right arm, endorses tingling going up and down the arm.    On exam:  General: NAD.  Head: NCAT.  ENT: nares patent, no scleral icterus  Neck: Supple, trachea midline.  Cardiac: regular rate and rhythm.  Lungs: normal effort, clear to auscultation bilaterally  Abdomen: Soft, nondistended, NTTP, no rebound tenderness, no guarding or rigidity.   Extremities: Moves all extremities well, no peripheral edema.  Right upper extremity in sling.  Patient has healing surgical incision on clavicle with no signs of infection. Positive thumbs up/okay sign/fingers abduct/fingers abduct, sensation intact light touch radial/medial/ulnar nerve distribution, 2+ radial and ulnar pulses, brisk cap refill all digits.  Neuro: alert, MAEW, follows commands  Psych: calm, cooperative  Skin: Warm, dry.    Medical Decision Making:  After the  initial H&P, I discussed pertinent information from history and physical exam with patient/family.  Discussed differential diagnosis.  Discussed plan for ED evaluation/work-up/treatment.  All questions answered.  Patient/family is agreeable with plan.    ED Course as of 04/22/24 0022   Sat Apr 20, 2024 2019 I discussed the case with Dr. Alexander and they agree to evaluate the patient at the bedside.    [AR]   2203 Patient rechecked. Patient continues to have pain. Dilaudid ordered. [AR]   2348 Patient continues to have pain, Dilaudid 1 mg IV x 1 ordered. [AR]   Sun Apr 21, 2024   0016 Patient rechecked.  Patient states pain has not resolved.  She is hesitant about being discharged home as she feels as if she will not have any pain relief at home with only Percocet.  Will attempt to admit patient for pain control. [AR]   0050 Phone call with MER Venegas with A.  Discussed the patient, relevant history, exam, diagnostics, ED findings/progress, and concerns. They agree to admit the patient to Dr. Russo. Care assumed by the admitting physician at this time.     [AR]      ED Course User Index  [AR] Emely Pozo APRN       Diagnosis  Final diagnoses:   Intractable pain          Johan Alexander MD  04/22/24 0022

## 2024-04-21 NOTE — PLAN OF CARE
Goal Outcome Evaluation:   Patient's pain will be well controlled with PRN and scheduled medications.

## 2024-04-21 NOTE — PLAN OF CARE
Goal Outcome Evaluation:    Problem: Adult Inpatient Plan of Care  Goal: Optimal Comfort and Wellbeing  Intervention: Monitor Pain and Promote Comfort  Recent Flowsheet Documentation  Taken 4/21/2024 1538 by Melina Jaeger RN  Pain Management Interventions: see MAR  Taken 4/21/2024 1028 by Melina Jaeger RN  Pain Management Interventions: position adjusted  Taken 4/21/2024 0958 by Melina Jaeger RN  Pain Management Interventions: see MAR   Plan of Care Reviewed With: patient alert,uncooperative, room air, c/o of right shoulder pain, gave PRN medication see MAR. MRI done gave valium 5 mg one time dose. Fall precautions maintained, bed lowest position, call light within reach

## 2024-04-21 NOTE — ED NOTES
"Patient states she had right clavicle surgery earlier this month and she is having problems with right arm pain now. Patient states, \"Those pain pills ain't doing shit.\" Patient is utilizing her right hand to text on her phone. Patient does have her sling on, but continues to move her right arm.   "

## 2024-04-22 PROBLEM — E66.01 MORBID OBESITY: Status: ACTIVE | Noted: 2024-04-22

## 2024-04-22 LAB
ANION GAP SERPL CALCULATED.3IONS-SCNC: 7 MMOL/L (ref 5–15)
BUN SERPL-MCNC: 11 MG/DL (ref 6–20)
BUN/CREAT SERPL: 15.7 (ref 7–25)
CALCIUM SPEC-SCNC: 8.4 MG/DL (ref 8.6–10.5)
CHLORIDE SERPL-SCNC: 110 MMOL/L (ref 98–107)
CO2 SERPL-SCNC: 23 MMOL/L (ref 22–29)
CREAT SERPL-MCNC: 0.7 MG/DL (ref 0.57–1)
CRP SERPL-MCNC: <0.3 MG/DL (ref 0–0.5)
DEPRECATED RDW RBC AUTO: 46.3 FL (ref 37–54)
EGFRCR SERPLBLD CKD-EPI 2021: 114.4 ML/MIN/1.73
ERYTHROCYTE [DISTWIDTH] IN BLOOD BY AUTOMATED COUNT: 12.7 % (ref 12.3–15.4)
GLUCOSE SERPL-MCNC: 98 MG/DL (ref 65–99)
HCT VFR BLD AUTO: 39.2 % (ref 34–46.6)
HGB BLD-MCNC: 14 G/DL (ref 12–15.9)
MAGNESIUM SERPL-MCNC: 1.9 MG/DL (ref 1.6–2.6)
MCH RBC QN AUTO: 35.4 PG (ref 26.6–33)
MCHC RBC AUTO-ENTMCNC: 35.7 G/DL (ref 31.5–35.7)
MCV RBC AUTO: 99.2 FL (ref 79–97)
PHOSPHATE SERPL-MCNC: 3.8 MG/DL (ref 2.5–4.5)
PLATELET # BLD AUTO: 190 10*3/MM3 (ref 140–450)
PMV BLD AUTO: 10.6 FL (ref 6–12)
POTASSIUM SERPL-SCNC: 4.2 MMOL/L (ref 3.5–5.2)
RBC # BLD AUTO: 3.95 10*6/MM3 (ref 3.77–5.28)
SODIUM SERPL-SCNC: 140 MMOL/L (ref 136–145)
WBC NRBC COR # BLD AUTO: 5.69 10*3/MM3 (ref 3.4–10.8)
WHOLE BLOOD HOLD SPECIMEN: NORMAL

## 2024-04-22 PROCEDURE — 97535 SELF CARE MNGMENT TRAINING: CPT

## 2024-04-22 PROCEDURE — 97530 THERAPEUTIC ACTIVITIES: CPT

## 2024-04-22 PROCEDURE — 63710000001 DIPHENHYDRAMINE PER 50 MG: Performed by: HOSPITALIST

## 2024-04-22 PROCEDURE — 83735 ASSAY OF MAGNESIUM: CPT | Performed by: HOSPITALIST

## 2024-04-22 PROCEDURE — 25010000002 KETOROLAC TROMETHAMINE PER 15 MG: Performed by: HOSPITALIST

## 2024-04-22 PROCEDURE — 85027 COMPLETE CBC AUTOMATED: CPT | Performed by: HOSPITALIST

## 2024-04-22 PROCEDURE — G0378 HOSPITAL OBSERVATION PER HR: HCPCS

## 2024-04-22 PROCEDURE — 96376 TX/PRO/DX INJ SAME DRUG ADON: CPT

## 2024-04-22 PROCEDURE — 97161 PT EVAL LOW COMPLEX 20 MIN: CPT

## 2024-04-22 PROCEDURE — 97166 OT EVAL MOD COMPLEX 45 MIN: CPT

## 2024-04-22 PROCEDURE — 80048 BASIC METABOLIC PNL TOTAL CA: CPT | Performed by: HOSPITALIST

## 2024-04-22 PROCEDURE — 84100 ASSAY OF PHOSPHORUS: CPT | Performed by: HOSPITALIST

## 2024-04-22 PROCEDURE — 86140 C-REACTIVE PROTEIN: CPT | Performed by: INTERNAL MEDICINE

## 2024-04-22 RX ORDER — OXYCODONE HYDROCHLORIDE AND ACETAMINOPHEN 5; 325 MG/1; MG/1
1 TABLET ORAL EVERY 4 HOURS PRN
Status: DISCONTINUED | OUTPATIENT
Start: 2024-04-22 | End: 2024-04-22

## 2024-04-22 RX ORDER — HYDROMORPHONE HYDROCHLORIDE 4 MG/1
2 TABLET ORAL EVERY 4 HOURS PRN
Status: DISCONTINUED | OUTPATIENT
Start: 2024-04-22 | End: 2024-04-23 | Stop reason: HOSPADM

## 2024-04-22 RX ORDER — HYDROMORPHONE HYDROCHLORIDE 4 MG/1
2 TABLET ORAL EVERY 4 HOURS PRN
Status: DISCONTINUED | OUTPATIENT
Start: 2024-04-22 | End: 2024-04-22 | Stop reason: SDUPTHER

## 2024-04-22 RX ADMIN — FLUTICASONE PROPIONATE 1 SPRAY: 50 SPRAY, METERED NASAL at 08:20

## 2024-04-22 RX ADMIN — Medication 10 ML: at 08:21

## 2024-04-22 RX ADMIN — TOPIRAMATE 200 MG: 100 TABLET, FILM COATED ORAL at 20:49

## 2024-04-22 RX ADMIN — OXYCODONE AND ACETAMINOPHEN 1 TABLET: 5; 325 TABLET ORAL at 11:54

## 2024-04-22 RX ADMIN — HYDROMORPHONE HYDROCHLORIDE 2 MG: 4 TABLET ORAL at 17:18

## 2024-04-22 RX ADMIN — DOCUSATE SODIUM 100 MG: 100 CAPSULE, LIQUID FILLED ORAL at 20:49

## 2024-04-22 RX ADMIN — METOPROLOL SUCCINATE 25 MG: 25 TABLET, EXTENDED RELEASE ORAL at 08:20

## 2024-04-22 RX ADMIN — PERPHENAZINE 2 MG: 2 TABLET, FILM COATED ORAL at 08:19

## 2024-04-22 RX ADMIN — DIPHENHYDRAMINE HYDROCHLORIDE 25 MG: 25 CAPSULE ORAL at 17:08

## 2024-04-22 RX ADMIN — DIPHENHYDRAMINE HYDROCHLORIDE 25 MG: 25 CAPSULE ORAL at 20:49

## 2024-04-22 RX ADMIN — BUSPIRONE HYDROCHLORIDE 10 MG: 10 TABLET ORAL at 17:09

## 2024-04-22 RX ADMIN — ATORVASTATIN CALCIUM 20 MG: 20 TABLET, FILM COATED ORAL at 20:48

## 2024-04-22 RX ADMIN — DIPHENHYDRAMINE HYDROCHLORIDE 25 MG: 25 CAPSULE ORAL at 08:19

## 2024-04-22 RX ADMIN — DOCUSATE SODIUM 100 MG: 100 CAPSULE, LIQUID FILLED ORAL at 08:20

## 2024-04-22 RX ADMIN — CYCLOBENZAPRINE 10 MG: 10 TABLET, FILM COATED ORAL at 08:20

## 2024-04-22 RX ADMIN — OLANZAPINE 10 MG: 10 TABLET, FILM COATED ORAL at 20:49

## 2024-04-22 RX ADMIN — CYCLOBENZAPRINE 10 MG: 10 TABLET, FILM COATED ORAL at 17:08

## 2024-04-22 RX ADMIN — BUSPIRONE HYDROCHLORIDE 10 MG: 10 TABLET ORAL at 08:19

## 2024-04-22 RX ADMIN — BUSPIRONE HYDROCHLORIDE 10 MG: 10 TABLET ORAL at 20:48

## 2024-04-22 RX ADMIN — TIZANIDINE 2 MG: 4 TABLET ORAL at 20:48

## 2024-04-22 RX ADMIN — CYCLOBENZAPRINE 10 MG: 10 TABLET, FILM COATED ORAL at 20:48

## 2024-04-22 RX ADMIN — TRAZODONE HYDROCHLORIDE 300 MG: 100 TABLET ORAL at 20:48

## 2024-04-22 RX ADMIN — KETOROLAC TROMETHAMINE 15 MG: 15 INJECTION, SOLUTION INTRAMUSCULAR; INTRAVENOUS at 11:58

## 2024-04-22 RX ADMIN — KETOROLAC TROMETHAMINE 15 MG: 15 INJECTION, SOLUTION INTRAMUSCULAR; INTRAVENOUS at 17:18

## 2024-04-22 RX ADMIN — Medication 10 ML: at 20:49

## 2024-04-22 RX ADMIN — HYDROMORPHONE HYDROCHLORIDE 2 MG: 4 TABLET ORAL at 22:47

## 2024-04-22 RX ADMIN — PERPHENAZINE 2 MG: 2 TABLET, FILM COATED ORAL at 20:48

## 2024-04-22 RX ADMIN — Medication 50 MG: at 08:20
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
## 2024-04-22 NOTE — NURSING NOTE
Consult called to Dr. Giang at 0647 am 4/22/24, left message on service answering machine.  Pt has been NPO since midnight, she did refuse labs this morning.

## 2024-04-22 NOTE — PLAN OF CARE
Goal Outcome Evaluation:  Plan of Care Reviewed With: patient           Outcome Evaluation: Pt. is a 38 year old Female admitted to the hospital with Right shoulder pain and recent Right shoulder sx (Distal Right clavicle resection).  Pt. reports that prior to admission she was independent with upright mobility.  Pt. currently presents with decreased strength, decreased balance, and decreased tolerance to functional activity. This PM, pt. able to ambulate 180 feet, CGA x 1, with no use of A.D. Pt. requires SBA x 1 for bed mobility and CGA x 1 for sit <-> stand transfers.  Mild unsteadiness throughout upright mobility this date.  Pt. will benefit from skilled inpt. P.T. to address her functional deficits and to assist pt. in regaining her maximum level of independence with functional mobility.      Anticipated Discharge Disposition (PT): home with assist

## 2024-04-22 NOTE — PLAN OF CARE
Goal Outcome Evaluation:  Plan of Care Reviewed With: patient        Progress: no change  Outcome Evaluation: Complaints of pain to right shoulder and neck area.  Right arm to sling.  Ortho consult Dr. Dorsey.  NPO since midnight.  SaO2 dropped to 60% during sleep, states she has sleep apnea but not on CPAP/BIPAP at home.  O2 2lpm via n/c

## 2024-04-22 NOTE — PROGRESS NOTES
Ms. Castro is now approximately 4 days out from an open distal clavicle excision.  She was admitted over the weekend for pain control.  She tells me that over the weekend she developed pain shooting from the shoulder all the way down her arm associated with numbness and tingling.  The pain would also radiate up towards her neck.  She was admitted by internal medicine for pain management and further workup.  She has been afebrile.  Denies any redness, drainage or other signs of infection.    Vitals:    04/21/24 2347 04/22/24 0336 04/22/24 0641 04/22/24 0752   BP: 109/64 110/56  99/61   BP Location: Left arm Left arm  Left arm   Patient Position: Lying Lying  Lying   Pulse:    66   Resp: 16 16  16   Temp: 98 °F (36.7 °C) 97.8 °F (36.6 °C)  97.5 °F (36.4 °C)   TempSrc: Oral Oral  Oral   SpO2:    95%   Weight:   125 kg (274 lb 14.6 oz)    Height:          White blood cell count 5.69.    Her surgical incision looks great.  The wound is well-approximated and benign appearing.  There is no erythema or drainage.  No fluctuance or increased warmth.  Gentle passive motion of her shoulder is very well-tolerated.    MRIs of her brachial plexus and cervical spine were ordered.  I have reviewed those results.  She appears to have a hematoma around the surgical site.  She has several large lymph nodes around the neck.  The MRI of her cervical spine shows mild left C4-5 foraminal stenosis.  I do not see any obvious source of her right-sided arm symptoms.    I explained to the patient that I do not see anything on her scans to account for her pain.  She will need a CT scan in a few months to further evaluate the lymph nodes as per the radiologist recommendation.  She can do that through her PCP.  I do not see anything on the scans that would account for her pain.  She certainly does not appear to have a wound infection.  I think we just need to do a better job of getting her pain under control.  I spoke with her nurse.  I  recommended we switch her from Percocet to oral Dilaudid.  Ms. Castro has taken hydrocodone in the past and I think she has a tolerance to narcotics.  If the oral Dilaudid works better for her this evening then she could probably go home with oral Dilaudid for a few days and then transition back to the Percocet.  Ms. Castro is in agreement with that plan.    Carlos Giang MD

## 2024-04-23 VITALS
WEIGHT: 274.91 LBS | SYSTOLIC BLOOD PRESSURE: 99 MMHG | DIASTOLIC BLOOD PRESSURE: 64 MMHG | OXYGEN SATURATION: 97 % | RESPIRATION RATE: 16 BRPM | HEART RATE: 65 BPM | HEIGHT: 67 IN | TEMPERATURE: 97.6 F | BODY MASS INDEX: 43.15 KG/M2

## 2024-04-23 PROBLEM — T14.8XXA HEMATOMA: Status: ACTIVE | Noted: 2024-04-23

## 2024-04-23 PROBLEM — R52 INTRACTABLE PAIN: Status: RESOLVED | Noted: 2024-04-21 | Resolved: 2024-04-23

## 2024-04-23 PROCEDURE — 25010000002 KETOROLAC TROMETHAMINE PER 15 MG: Performed by: HOSPITALIST

## 2024-04-23 PROCEDURE — 63710000001 DIPHENHYDRAMINE PER 50 MG: Performed by: HOSPITALIST

## 2024-04-23 PROCEDURE — 96376 TX/PRO/DX INJ SAME DRUG ADON: CPT

## 2024-04-23 PROCEDURE — G0378 HOSPITAL OBSERVATION PER HR: HCPCS

## 2024-04-23 RX ORDER — NALOXONE HYDROCHLORIDE 4 MG/.1ML
SPRAY NASAL
Qty: 2 EACH | Refills: 0 | Status: SHIPPED | OUTPATIENT
Start: 2024-04-23

## 2024-04-23 RX ORDER — HYDROMORPHONE HYDROCHLORIDE 2 MG/1
2 TABLET ORAL EVERY 6 HOURS PRN
Qty: 30 TABLET | Refills: 0 | Status: SHIPPED | OUTPATIENT
Start: 2024-04-23

## 2024-04-23 RX ORDER — OXYCODONE AND ACETAMINOPHEN 10; 325 MG/1; MG/1
1 TABLET ORAL EVERY 4 HOURS PRN
Qty: 42 TABLET | Refills: 0 | Status: SHIPPED | OUTPATIENT
Start: 2024-04-23

## 2024-04-23 RX ADMIN — KETOROLAC TROMETHAMINE 15 MG: 15 INJECTION, SOLUTION INTRAMUSCULAR; INTRAVENOUS at 08:24

## 2024-04-23 RX ADMIN — CYCLOBENZAPRINE 10 MG: 10 TABLET, FILM COATED ORAL at 08:14

## 2024-04-23 RX ADMIN — Medication 10 ML: at 08:15

## 2024-04-23 RX ADMIN — BUSPIRONE HYDROCHLORIDE 10 MG: 10 TABLET ORAL at 08:14

## 2024-04-23 RX ADMIN — FLUTICASONE PROPIONATE 1 SPRAY: 50 SPRAY, METERED NASAL at 08:15

## 2024-04-23 RX ADMIN — PERPHENAZINE 2 MG: 2 TABLET, FILM COATED ORAL at 08:14

## 2024-04-23 RX ADMIN — DOCUSATE SODIUM 100 MG: 100 CAPSULE, LIQUID FILLED ORAL at 08:14

## 2024-04-23 RX ADMIN — DIPHENHYDRAMINE HYDROCHLORIDE 25 MG: 25 CAPSULE ORAL at 02:04

## 2024-04-23 RX ADMIN — Medication 50 MG: at 08:14

## 2024-04-23 RX ADMIN — KETOROLAC TROMETHAMINE 15 MG: 15 INJECTION, SOLUTION INTRAMUSCULAR; INTRAVENOUS at 02:04

## 2024-04-23 RX ADMIN — HYDROMORPHONE HYDROCHLORIDE 2 MG: 4 TABLET ORAL at 06:11

## 2024-04-23 RX ADMIN — POLYETHYLENE GLYCOL 3350 17 G: 17 POWDER, FOR SOLUTION ORAL at 08:13

## 2024-04-23 RX ADMIN — METOPROLOL SUCCINATE 25 MG: 25 TABLET, EXTENDED RELEASE ORAL at 08:14

## 2024-04-23 RX ADMIN — DIPHENHYDRAMINE HYDROCHLORIDE 25 MG: 25 CAPSULE ORAL at 08:14

## 2024-04-23 NOTE — PLAN OF CARE
Goal Outcome Evaluation:               No significant change this shift. VSS.

## 2024-04-23 NOTE — PROGRESS NOTES
Discharge Planning Assessment  [facility]     Patient Name: Laya Castro  MRN: [id number]  Today's Date: 4/23/2024    Admit Date: 4/20/2024    Plan: Home   Discharge Needs Assessment    No documentation.                  Discharge Plan       Row Name 04/23/24 1806       Plan    Plan Home    Final Discharge Disposition Code 01 - home or self-care    Final Note Home                  Continued Care and Services - Discharged on 4/23/2024 Admission date: 4/20/2024 - Discharge disposition: Home or Self Care   No active coordination exists for this encounter.       Expected Discharge Date and Time       Expected Discharge Date Expected Discharge Time    Apr 23, 2024            Demographic Summary    No documentation.                  Functional Status    No documentation.                  Psychosocial    No documentation.                  Abuse/Neglect    No documentation.                  Legal    No documentation.                  Substance Abuse    No documentation.                  Patient Forms    No documentation.                     Sadie Bell RN

## 2024-04-23 NOTE — TELEPHONE ENCOUNTER
Patient called ans states insurance will not cover the Dilaudid and wants to go home with Percocet 10 sent to [facility] [address] [place]

## 2024-04-23 NOTE — PROGRESS NOTES
Patient reports that the Dilaudid helped control her pain much better.  She says that she is no longer experiencing the shooting pain down the arm, numbness or tingling.  She has a small dog at home and she feels like she needs to get home.  She says she feels like her pain is controlled enough to go home today.  I am okay with that if she is cleared by the medical team.  I have called in a limited prescription for oral Dilaudid.  Will have her take that until her follow-up with me next week.  Plan to transition her to Percocet thereafter and then gradually taper her down off the narcotics.    Carlos Giang MD
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
## 2024-04-26 ENCOUNTER — TELEPHONE (OUTPATIENT)
Dept: ORTHOPEDIC SURGERY | Facility: CLINIC | Age: 39
End: 2024-04-26

## 2024-04-26 NOTE — TELEPHONE ENCOUNTER
HUB AGENT ATTEMPTED CLINICAL WARM TRANSFER - NO ANSWER     PATIENT CALLED x 4 TODAY 04/26/24     PATIENT WANTS TO DISCUSS A PRIOR AUTH NEEDED PER PHARMACY FOR PERCOCET 10 MG (ONE TABLET BY MOUTH EVERY 4 HOURS AS NEEDED)     04/23/24 SCRIPT RECEIVED BY PATIENT's [facility] # [id number] [phone])     PATIENT STATED SHE HAS # 6 DILAUDID LEFT BUT DOESN'T WANT TO TAKE THOSE & GET ADDICTED BUT ALSO GOING TO RUN OUT OVER THE WEEKEND    PLEASE CALL / LEAVE VMAIL TO DISCUSS     THANKS

## 2024-04-26 NOTE — TELEPHONE ENCOUNTER
Caller: MIMI EL      Relationship to patient: SELF    Best call back number: [phone]    Patient is needing: PT IS CALLING BACK TO FOLLOW UP ON THE PA FOR HER RX.. PLEASE ADVISE

## 2024-04-26 NOTE — TELEPHONE ENCOUNTER
Hub staff attempted to follow warm transfer process and was unsuccessful     Caller: PATIENT    Relationship to patient: SELF     Best call back number: [phone]    Patient is needing: PATIENT WAS CALLING TO DISCUSS HER PHARMACY, [facility]ANTAL INFORMING HER THAT A PRIOR AUTHORIZATION IS NEEDED FOR HER MEDICATION, OXYCODONE -ACETAMINOPHEN 10 MG. PATIENT IS REQUESTING A CALL BACK TO DISCUSS THIS. THANK YOU!

## 2024-04-26 NOTE — TELEPHONE ENCOUNTER
Caller: MIMI EL     Relationship: PATIENT     Best call back number: [phone]      PATIENT CALLING BACK TO CHECK STATUS OF PA FOR RX.  PLEASE CALL PATIENT TO DISCUSS

## 2024-04-29 NOTE — TELEPHONE ENCOUNTER
I spoke to both Pharmacies on Friday 4/26/24 Patient did get Dilaudid from [facility] when she was discharged. Also there was a prescription sent to the pharmacy at [place] and [address] [place] [address] for Oxycodone Per Rodriguez they could not fill it due to the patient getting DIlaudid for [facility] pharmacy

## 2024-04-30 ENCOUNTER — TELEPHONE (OUTPATIENT)
Dept: ORTHOPEDIC SURGERY | Facility: CLINIC | Age: 39
End: 2024-04-30
Payer: MEDICAID

## 2024-04-30 DIAGNOSIS — M25.511 RIGHT SHOULDER PAIN, UNSPECIFIED CHRONICITY: Primary | ICD-10-CM

## 2024-04-30 NOTE — TELEPHONE ENCOUNTER
Hub staff attempted to follow warm transfer process and was unsuccessful     Caller: Laya Castro    Relationship to patient: Self    Best call back number: [phone] (home)      Patient is needing: PATIENT CALLED FOR AN UPDATE ON THIS REQUEST. I TOLD PATIENT THERE IS A 48 HOUR TURNAROUND TIME FOR FOLLOWUP. SHE RELAYED IT WAS SUPPOSED TO BE WORKED AS URGENT. I INFORMED THE PATIENT IT WOULD STILL BE AT LEAST 48 HOURS. PATIENT SAID IT HAS BEEEN PAST THAT. AND DISCONNECT THE CALL. PLEASE CALL AND UPDATE THIS PATIENT ON THE STATUS OF THIS REQUEST AS SOON AS POSSIBLE

## 2024-04-30 NOTE — TELEPHONE ENCOUNTER
PATIENT CALLED TO CHECK ON THE STATUS. SHE IS REQUESTING A CALL BACK FROM [place]. STATES SHE MAY GO TO HOSPITAL DUE TO PAIN. PLEASE ADVISE.    CALL BACK #: [phone]

## 2024-04-30 NOTE — TELEPHONE ENCOUNTER
Caller: Laya Castro    Relationship to patient: Self    Best call back number:     Patient is needing: UNABLE TO WARM TRANSFER.  PATIENT IS HAVING TROUBLE GETTING MEDICATION AND NEEDS TO SEE WHAT'S GOING ON WITH PRIOR AUTH.  PATIENTS BACK IS GOING STIFF AND SHE HAD 4 FALLS THIS WEEKEND AND ALMOST FELL DOWN A FLIGHT OF STAIRS

## 2024-04-30 NOTE — TELEPHONE ENCOUNTER
PATIENT HAS CALLED ON NUMEROUS OCCASION OVER THE PAST 5 DAYS TRYING TO GET A STATUS UPDATE ON HER MEDICATION. EACH TIME, THE HUB IS UNABLE TO WT.   PATIENT NEEDS TO SPEAK WITH SOMEONE FROM THE CLINICAL TEAM ABOUT THIS.   THERE ARE NOW 2 TE'S OPEN IN REGARDS TO THIS.   PATIENT STATES SHE IS WAITING ON PRIOR AUTH TO GET THE PERCOCET THAT WAS SENT IN ON 4/23/24.   PLEASE CALL PATIENT [phone]

## 2024-04-30 NOTE — TELEPHONE ENCOUNTER
Patient called the office back and I let her know that georgi has spoke with the pharmacies today and has sent [facility] a message regarding this. She stated that she has fallen several times over the weekend. I advised her that if she is in severe pain and with the falls then she should go to the ER to get evaluated.

## 2024-04-30 NOTE — TELEPHONE ENCOUNTER
Dr. Giang I have called to verify all information in regards to the pain medication. Patient picked up Oxycodone # 42 every 4 hours PRN on 4/19/24 at [facility] after she was discharged from the hospital following surgery. Patient was then readmitted on 4/20/24 and discharged on 4/23/24 at that time patient was given a prescription of Dilaudid through the [facility]. She originally called and stated to Myself she would not get the Dilaudid because she could not afford it. At that time she asked for another Prescription of Oxycodone to go to the [facility].  That was sent into the [facility] pharmacy.  That prescription was then canceled because it was a duplicate request for pain medication .  Please review and advise

## 2024-05-01 RX ORDER — OXYCODONE AND ACETAMINOPHEN 10; 325 MG/1; MG/1
1 TABLET ORAL EVERY 4 HOURS PRN
Qty: 42 TABLET | Refills: 0 | Status: SHIPPED | OUTPATIENT
Start: 2024-05-01 | End: 2024-05-06 | Stop reason: SDUPTHER

## 2024-05-01 NOTE — TELEPHONE ENCOUNTER
Last refill 04/23/24    Surgery 04/11/24    Last appointment 03/11/24    Future appointment 05/06/24

## 2024-05-01 NOTE — TELEPHONE ENCOUNTER
Hub ok to relay     I have spoke to the insurance company and submitted an Urgent PA for the pain medication Case #[id number]

## 2024-05-01 NOTE — TELEPHONE ENCOUNTER
PATIENT CALLED BACK AND STATED [facility] STATES THEY DO NOT HAVE THE PRESCRIPTION AND WANTED TO KNOW IF WE COULD CALL THEM - PLEASE ADVISE

## 2024-05-01 NOTE — TELEPHONE ENCOUNTER
"I called Ms. Castro to let her know that the pain medication has been sent to the pharmacy and I spent 30 minutes on the phone trying to reach the pharmacy. She said that they told her the prescription needed a PA. I told her I would submit the PA.  Ms. Castro then told me she has falling about 5 times and once down 4 steps on Sunday 4/28. I asked if she had been checked out or reported to her PCP and she said \"NO, it didn't happen until my arm started hurting. I explained that her Shoulder hurting should not be causing her to fall. Sh said it makes her back stiffen up and then her legs give out and she falls.   She then stated that her shoulder wound was completely opened. I explained to her that she needed to go to the hospital. Her only response was \"OK\".   Please advise "

## 2024-05-01 NOTE — TELEPHONE ENCOUNTER
I have called patient back and advised I have completed the PA and per  she should proceed to the ER if her wound is open and she continues to fall.

## 2024-05-01 NOTE — TELEPHONE ENCOUNTER
"YES, THE PATIENT IS CALLING FOR A REFILL. ONE WAS SENT ON 4/23/24, SHE WAS UNABLE TO GET IT DUE TO PA OR DUPLICATE.   THIS PATIENT HAS CALLED MULTIPLE TIMES FOR THIS MEDICATION.     PATIENT IS NOW STATING THAT THE INCISION FORM SX IS NOT HEALING AND IS \"OPEN\"     PLEASE CALL PATIENT AND ADVISE ON WHAT IS BEING DONE   "

## 2024-05-06 ENCOUNTER — OFFICE VISIT (OUTPATIENT)
Dept: ORTHOPEDIC SURGERY | Facility: CLINIC | Age: 39
End: 2024-05-06
Payer: MEDICAID

## 2024-05-06 VITALS — WEIGHT: 260.3 LBS | HEIGHT: 67 IN | TEMPERATURE: 98.7 F | BODY MASS INDEX: 40.85 KG/M2

## 2024-05-06 DIAGNOSIS — Z09 SURGERY FOLLOW-UP: Primary | ICD-10-CM

## 2024-05-06 DIAGNOSIS — S42.034G: ICD-10-CM

## 2024-05-06 DIAGNOSIS — M25.511 RIGHT SHOULDER PAIN, UNSPECIFIED CHRONICITY: ICD-10-CM

## 2024-05-06 PROCEDURE — 73000 X-RAY EXAM OF COLLAR BONE: CPT | Performed by: NURSE PRACTITIONER

## 2024-05-06 PROCEDURE — 1159F MED LIST DOCD IN RCRD: CPT | Performed by: NURSE PRACTITIONER

## 2024-05-06 PROCEDURE — 99024 POSTOP FOLLOW-UP VISIT: CPT | Performed by: NURSE PRACTITIONER

## 2024-05-06 PROCEDURE — 1160F RVW MEDS BY RX/DR IN RCRD: CPT | Performed by: NURSE PRACTITIONER

## 2024-05-06 RX ORDER — OXYCODONE AND ACETAMINOPHEN 10; 325 MG/1; MG/1
1 TABLET ORAL EVERY 4 HOURS PRN
Qty: 42 TABLET | Refills: 0 | Status: SHIPPED | OUTPATIENT
Start: 2024-05-06

## 2024-05-06 RX ORDER — CEPHALEXIN 500 MG/1
500 CAPSULE ORAL 3 TIMES DAILY
Qty: 21 CAPSULE | Refills: 0 | Status: SHIPPED | OUTPATIENT
Start: 2024-05-06

## 2024-05-21 ENCOUNTER — TELEPHONE (OUTPATIENT)
Dept: ORTHOPEDIC SURGERY | Facility: CLINIC | Age: 39
End: 2024-05-21

## 2024-05-21 NOTE — TELEPHONE ENCOUNTER
Hub staff attempted to follow warm transfer process and was unsuccessful     Caller: PATIENT    Relationship to patient: SELF     Best call back number: [phone]    Patient is needing: PATIENT WAS CALLING TO SPEAK WITH [place] TO REQUEST MORE MEDICATION FOR HER PAIN. PATIENT STATED SHE HAS BEEN IN BED FOR TWO DAYS BECAUSE SHE KEEPS DROPPING STUFF. PATIENT IS REQUESTING A CALL BACK TO DISCUSS HER RIGHT SHOULDER PAIN AND GETTING PAIN MEDICATION. THANK YOU!

## 2024-05-22 DIAGNOSIS — M25.511 RIGHT SHOULDER PAIN, UNSPECIFIED CHRONICITY: ICD-10-CM

## 2024-05-22 NOTE — TELEPHONE ENCOUNTER
Caller: Laya Castro    Relationship: Self    Best call back number: [phone]    Requested Prescriptions:   Requested Prescriptions     Pending Prescriptions Disp Refills    oxyCODONE-acetaminophen (PERCOCET)  MG per tablet 42 tablet 0     Sig: Take 1 tablet by mouth Every 4 (Four) Hours As Needed for Moderate Pain or Severe Pain.        Pharm[facility] where request should be sent: [id number] [address] [facility] AT [address] [place] [address] [phone] [facility] [phone] FX     Last office visit with prescribing clinician: 3/11/2024   Last telemedicine visit with prescribing clinician: Visit date not found   Next office visit with prescribing clinician: Visit date not found     Additional details provided by patient: NA     Does the patient have less than a 3 day supply:  [x] Yes  [] No    Would you like a call back once the refill request has been completed: [x] Yes [] No    If the office needs to give you a call back, can they leave a voicemail: [x] Yes [] No    Andrea Tripp Rep   05/22/24 16:16 EDT

## 2024-05-23 RX ORDER — OXYCODONE AND ACETAMINOPHEN 10; 325 MG/1; MG/1
1 TABLET ORAL EVERY 8 HOURS PRN
Qty: 20 TABLET | Refills: 0 | Status: SHIPPED | OUTPATIENT
Start: 2024-05-23

## 2024-05-23 NOTE — TELEPHONE ENCOUNTER
Caller: Laya Castro    Relationship to patient: Self    Best call back number: [phone]    Patient is needing: PT IS CALLING TO CHECK THE STATUS OF HER RX.. PT WOULD LIKE A CALL BACK ONCE IT HAS BEEN SENT.. PLEASE ADVISE..

## 2024-05-23 NOTE — TELEPHONE ENCOUNTER
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

## 2024-05-23 NOTE — TELEPHONE ENCOUNTER
Patient no Showed her last PO appointment. She is not schedule for another post op. Please advise .

## 2024-05-24 ENCOUNTER — TELEPHONE (OUTPATIENT)
Dept: ORTHOPEDIC SURGERY | Facility: CLINIC | Age: 39
End: 2024-05-24
Payer: MEDICAID

## 2024-05-24 DIAGNOSIS — M25.511 RIGHT SHOULDER PAIN, UNSPECIFIED CHRONICITY: Primary | ICD-10-CM

## 2024-05-24 RX ORDER — OXYCODONE AND ACETAMINOPHEN 10; 325 MG/1; MG/1
1 TABLET ORAL EVERY 6 HOURS PRN
Qty: 30 TABLET | Refills: 0 | Status: SHIPPED | OUTPATIENT
Start: 2024-05-24

## 2024-05-24 NOTE — TELEPHONE ENCOUNTER
"I spoke with this patient.  She says that she still needs the narcotic pain medicine.  She also says that the shoulder feels like it is getting stiff and \"locking up\".  I asked her if she went to the therapy as recommended.  She did not.  She insist that they never contacted her.  I told her I am going to enter a new order.  If she has not heard from them by the first of next week she needs to notify us.  I explained that she needs to get moving her shoulder to prevent it from freezing up.  I agreed to give her 1 more prescription for the Percocet but I explained that this will be the last prescription I can give her until she is seen for follow-up.  Risk of this medicine were discussed.  She understands that she needs to make a new follow-up visit with us so that we can reevaluate.  In the meantime, I do want her to start PT.  She acknowledged understanding.  "

## 2024-05-27 ENCOUNTER — HOSPITAL ENCOUNTER (EMERGENCY)
Facility: HOSPITAL | Age: 39
Discharge: HOME | End: 2024-05-27
Attending: EMERGENCY MEDICINE | Admitting: EMERGENCY MEDICINE
Payer: MEDICAID

## 2024-05-27 ENCOUNTER — APPOINTMENT (OUTPATIENT)
Dept: CT IMAGING | Facility: HOSPITAL | Age: 39
End: 2024-05-27
Payer: MEDICAID

## 2024-05-27 VITALS
BODY MASS INDEX: 41.59 KG/M2 | HEIGHT: 67 IN | RESPIRATION RATE: 16 BRPM | WEIGHT: 265 LBS | HEART RATE: 82 BPM | DIASTOLIC BLOOD PRESSURE: 62 MMHG | TEMPERATURE: 98.2 F | SYSTOLIC BLOOD PRESSURE: 124 MMHG | OXYGEN SATURATION: 95 %

## 2024-05-27 DIAGNOSIS — R51.9 ACUTE NONINTRACTABLE HEADACHE, UNSPECIFIED HEADACHE TYPE: Primary | ICD-10-CM

## 2024-05-27 DIAGNOSIS — M25.511 ACUTE PAIN OF RIGHT SHOULDER: ICD-10-CM

## 2024-05-27 LAB
ALBUMIN SERPL-MCNC: 4.2 G/DL (ref 3.5–5.2)
ALBUMIN/GLOB SERPL: 1.4 G/DL
ALP SERPL-CCNC: 89 U/L (ref 39–117)
ALT SERPL W P-5'-P-CCNC: 17 U/L (ref 1–33)
ANION GAP SERPL CALCULATED.3IONS-SCNC: 10 MMOL/L (ref 5–15)
AST SERPL-CCNC: 11 U/L (ref 1–32)
BASOPHILS # BLD AUTO: 0.05 10*3/MM3 (ref 0–0.2)
BASOPHILS NFR BLD AUTO: 0.6 % (ref 0–1.5)
BILIRUB SERPL-MCNC: 0.2 MG/DL (ref 0–1.2)
BUN SERPL-MCNC: 14 MG/DL (ref 6–20)
BUN/CREAT SERPL: 16.1 (ref 7–25)
CALCIUM SPEC-SCNC: 9.6 MG/DL (ref 8.6–10.5)
CHLORIDE SERPL-SCNC: 107 MMOL/L (ref 98–107)
CO2 SERPL-SCNC: 27 MMOL/L (ref 22–29)
CREAT SERPL-MCNC: 0.87 MG/DL (ref 0.57–1)
DEPRECATED RDW RBC AUTO: 43.9 FL (ref 37–54)
EGFRCR SERPLBLD CKD-EPI 2021: 88.1 ML/MIN/1.73
EOSINOPHIL # BLD AUTO: 0.27 10*3/MM3 (ref 0–0.4)
EOSINOPHIL NFR BLD AUTO: 3.4 % (ref 0.3–6.2)
ERYTHROCYTE [DISTWIDTH] IN BLOOD BY AUTOMATED COUNT: 12.4 % (ref 12.3–15.4)
GLOBULIN UR ELPH-MCNC: 3 GM/DL
GLUCOSE SERPL-MCNC: 86 MG/DL (ref 65–99)
HCG SERPL QL: NEGATIVE
HCT VFR BLD AUTO: 42.3 % (ref 34–46.6)
HGB BLD-MCNC: 14.7 G/DL (ref 12–15.9)
IMM GRANULOCYTES # BLD AUTO: 0.02 10*3/MM3 (ref 0–0.05)
IMM GRANULOCYTES NFR BLD AUTO: 0.3 % (ref 0–0.5)
LYMPHOCYTES # BLD AUTO: 3.33 10*3/MM3 (ref 0.7–3.1)
LYMPHOCYTES NFR BLD AUTO: 42.5 % (ref 19.6–45.3)
MCH RBC QN AUTO: 33.6 PG (ref 26.6–33)
MCHC RBC AUTO-ENTMCNC: 34.8 G/DL (ref 31.5–35.7)
MCV RBC AUTO: 96.6 FL (ref 79–97)
MONOCYTES # BLD AUTO: 0.42 10*3/MM3 (ref 0.1–0.9)
MONOCYTES NFR BLD AUTO: 5.4 % (ref 5–12)
NEUTROPHILS NFR BLD AUTO: 3.74 10*3/MM3 (ref 1.7–7)
NEUTROPHILS NFR BLD AUTO: 47.8 % (ref 42.7–76)
NRBC BLD AUTO-RTO: 0 /100 WBC (ref 0–0.2)
PLATELET # BLD AUTO: 249 10*3/MM3 (ref 140–450)
PMV BLD AUTO: 10 FL (ref 6–12)
POTASSIUM SERPL-SCNC: 3.9 MMOL/L (ref 3.5–5.2)
PROT SERPL-MCNC: 7.2 G/DL (ref 6–8.5)
RBC # BLD AUTO: 4.38 10*6/MM3 (ref 3.77–5.28)
SODIUM SERPL-SCNC: 144 MMOL/L (ref 136–145)
WBC NRBC COR # BLD AUTO: 7.83 10*3/MM3 (ref 3.4–10.8)

## 2024-05-27 PROCEDURE — 25010000002 ONDANSETRON PER 1 MG: Performed by: NURSE PRACTITIONER

## 2024-05-27 PROCEDURE — 25010000002 MORPHINE PER 10 MG: Performed by: NURSE PRACTITIONER

## 2024-05-27 PROCEDURE — 80053 COMPREHEN METABOLIC PANEL: CPT | Performed by: NURSE PRACTITIONER

## 2024-05-27 PROCEDURE — 96361 HYDRATE IV INFUSION ADD-ON: CPT

## 2024-05-27 PROCEDURE — 25810000003 SODIUM CHLORIDE 0.9 % SOLUTION: Performed by: NURSE PRACTITIONER

## 2024-05-27 PROCEDURE — 96374 THER/PROPH/DIAG INJ IV PUSH: CPT

## 2024-05-27 PROCEDURE — 70450 CT HEAD/BRAIN W/O DYE: CPT

## 2024-05-27 PROCEDURE — 84703 CHORIONIC GONADOTROPIN ASSAY: CPT | Performed by: NURSE PRACTITIONER

## 2024-05-27 PROCEDURE — 99284 EMERGENCY DEPT VISIT MOD MDM: CPT

## 2024-05-27 PROCEDURE — 96375 TX/PRO/DX INJ NEW DRUG ADDON: CPT

## 2024-05-27 PROCEDURE — 85025 COMPLETE CBC W/AUTO DIFF WBC: CPT | Performed by: NURSE PRACTITIONER

## 2024-05-27 RX ORDER — MORPHINE SULFATE 2 MG/ML
4 INJECTION, SOLUTION INTRAMUSCULAR; INTRAVENOUS ONCE
Status: COMPLETED | OUTPATIENT
Start: 2024-05-27 | End: 2024-05-27

## 2024-05-27 RX ORDER — ONDANSETRON 2 MG/ML
4 INJECTION INTRAMUSCULAR; INTRAVENOUS ONCE
Status: COMPLETED | OUTPATIENT
Start: 2024-05-27 | End: 2024-05-27

## 2024-05-27 RX ORDER — SODIUM CHLORIDE 0.9 % (FLUSH) 0.9 %
10 SYRINGE (ML) INJECTION AS NEEDED
Status: DISCONTINUED | OUTPATIENT
Start: 2024-05-27 | End: 2024-05-27 | Stop reason: HOSPADM

## 2024-05-27 RX ADMIN — MORPHINE SULFATE 4 MG: 2 INJECTION, SOLUTION INTRAMUSCULAR; INTRAVENOUS at 03:47

## 2024-05-27 RX ADMIN — SODIUM CHLORIDE 1000 ML: 9 INJECTION, SOLUTION INTRAVENOUS at 03:47

## 2024-05-27 RX ADMIN — ONDANSETRON 4 MG: 2 INJECTION INTRAMUSCULAR; INTRAVENOUS at 03:47
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
## 2024-05-27 NOTE — DISCHARGE INSTRUCTIONS
You have been given emergency department evaluation.  This evaluation is intended to rule out life-threatening conditions.  Is not a complete evaluation.  You could require further testing as determined by your primary care physician or any referred specialist.  Please follow-up with all doctors that you are referred to.  Please be sure to take your prescribed medications and follow any specific instructions in the discharge instructions.  Please follow-up with your primary care physician within 48 hours.  Please have your primary care provider recheck your blood pressure.  Rest.  Drink plenty of fluids, stay hydrated.  Follow up with Dr. Giang for further evaluation and management of shoulder/arm pain.  Take Tylenol or ibuprofen as needed for pain.  Take both medications as directed per the over-the-counter label.  Please return to the emergency department if you experience chest pain, shortness of breath, abdominal pain, fever greater than 102, intractable vomiting.  Please return to the emergency department if your symptoms continue or worsen, or if you begin to experience any other concerning symptom.

## 2024-05-27 NOTE — ED TRIAGE NOTES
"To ER via EMS from home.  C/o rt shoulder pain and dizziness.Pt states had surgery on shoulder 1 month ago.  \"They took pieces of my collarbone out.\"  Also c/o dizziness that started a couple hours ago.  C/o headache and neck pain.   "

## 2024-06-05 ENCOUNTER — TELEPHONE (OUTPATIENT)
Dept: ORTHOPEDIC SURGERY | Facility: CLINIC | Age: 39
End: 2024-06-05

## 2024-06-05 NOTE — TELEPHONE ENCOUNTER
Caller: PATIENT    Relationship: SELF     Best call back number: [phone]    What form or medical record are you requesting: SURGICAL NOTE AND XRAY REPORT FROM  XRAY COMPLETED ON 05.06.24 OF THE RIGHT CLAVICLE.    Who is requesting this form or medical record from you: PATIENT    How would you like to receive the form or medical records (pick-up, mail, fax):   PATIENT WOULD LIKE TO PICK THIS UP FROM THE [facility] OFFICE.    Timeframe paperwork needed: ASAP    Additional notes: PATIENT WOULD LIKE TO  THE FOLLOWING MEDICAL RECORDS FROM THE [facility] OFFICE.

## 2024-06-13 ENCOUNTER — TELEPHONE (OUTPATIENT)
Dept: ORTHOPEDIC SURGERY | Facility: CLINIC | Age: 39
End: 2024-06-13
Payer: MEDICAID

## 2024-06-13 DIAGNOSIS — M25.511 RIGHT SHOULDER PAIN, UNSPECIFIED CHRONICITY: ICD-10-CM

## 2024-06-13 RX ORDER — OXYCODONE AND ACETAMINOPHEN 10; 325 MG/1; MG/1
1 TABLET ORAL EVERY 6 HOURS PRN
Qty: 30 TABLET | Refills: 0 | OUTPATIENT
Start: 2024-06-13

## 2024-06-13 NOTE — TELEPHONE ENCOUNTER
Hub staff attempted to follow warm transfer process and was unsuccessful     Caller: Laya Castro    Relationship to patient: Self    Best call back number: [phone]    Patient is needing: PATIENT CALLING TO CHECK ON REFILL REQUEST. HUB UNABLE TO RELAY MESSAGE REGARDING NEEDING AN APPT.

## 2024-06-13 NOTE — TELEPHONE ENCOUNTER
Caller: Laya Castro    Relationship: Self    Best call back number: [phone]    Requested Prescriptions: OXYCODONE     Pharmacy where request should be sent: [facility] [id number] - [address] - [phone]  - [phone]  [phone]      Last office visit with prescribing clinician: 3/11/2024     Next office visit with prescribing clinician: 7/5/2024     Additional details provided by patient: N/A    Does the patient have less than a 3 day supply:  [x] Yes  [] No    Would you like a call back once the refill request has been completed: [x] Yes [] No    If the office needs to give you a call back, can they leave a voicemail: [x] Yes [] No    Wily Man   06/13/24 10:04 EDT

## 2024-06-13 NOTE — TELEPHONE ENCOUNTER
If patient calls back hub can relay patient needs follow up appointment before meds can be refilled.

## 2024-09-30 NOTE — DISCHARGE INSTRUCTIONS
Sitz bath twice per day    Return to ER for any fevers or chills   I have personally provided the amount of critical care time documented below excluding time spent on separate procedures.

## 2024-11-14 ENCOUNTER — TELEPHONE (OUTPATIENT)
Dept: ORTHOPEDIC SURGERY | Facility: CLINIC | Age: 39
End: 2024-11-14
Payer: MEDICAID

## 2024-11-14 NOTE — TELEPHONE ENCOUNTER
Caller: Laya El    Relationship to patient: Self    Best call back number:     Patient is needing: MS EL IS HAVING A LOT OF PAIN IN HER RT SHOULDER. SHE HAS AN APPT WITH CONTRERAS LUEVANO IN DECEMBER BUT WOULD LIKE TO KNOW IF DR POOLE COULD PRESCRIBE SOMETHING TO HELP HER PAIN UNTIL SHE IS SEEN    [facility] [id number] - [address] - [phone]  - [phone]  [phone]

## 2024-11-15 RX ORDER — MELOXICAM 15 MG/1
15 TABLET ORAL DAILY
Qty: 30 TABLET | Refills: 0 | Status: SHIPPED | OUTPATIENT
Start: 2024-11-15

## 2025-01-27 ENCOUNTER — TELEPHONE (OUTPATIENT)
Dept: ORTHOPEDIC SURGERY | Facility: CLINIC | Age: 40
End: 2025-01-27

## 2025-02-08 ENCOUNTER — HOSPITAL ENCOUNTER (OUTPATIENT)
Facility: HOSPITAL | Age: 40
Setting detail: OBSERVATION
Discharge: HOME | End: 2025-02-12
Attending: EMERGENCY MEDICINE | Admitting: HOSPITALIST
Payer: MEDICAID

## 2025-02-08 DIAGNOSIS — L03.116 LEFT LEG CELLULITIS: ICD-10-CM

## 2025-02-08 DIAGNOSIS — A41.9 SEPSIS WITHOUT ACUTE ORGAN DYSFUNCTION, DUE TO UNSPECIFIED ORGANISM: Primary | ICD-10-CM

## 2025-02-08 PROCEDURE — 96365 THER/PROPH/DIAG IV INF INIT: CPT

## 2025-02-08 PROCEDURE — 99285 EMERGENCY DEPT VISIT HI MDM: CPT

## 2025-02-08 PROCEDURE — 99211 OFF/OP EST MAY X REQ PHY/QHP: CPT

## 2025-02-08 NOTE — LETTER
[facility]  [facility]  [phone]    ACCESS CENTER STATEMENT OF DISPOSITION        I, Laya Castro, was assessed in the [facility] at [facility] on 2/10/2025.  I understand the recommendations below and what follow-up action is expected of me.      {Follow-ups:[id number]}                ________________________________  Patient/Parent/Guardian/POA Signature    ________________________________  Clinician Signature    2/10/2025  11:02 EST

## 2025-02-09 ENCOUNTER — APPOINTMENT (OUTPATIENT)
Dept: GENERAL RADIOLOGY | Facility: HOSPITAL | Age: 40
End: 2025-02-09
Payer: MEDICAID

## 2025-02-09 ENCOUNTER — APPOINTMENT (OUTPATIENT)
Dept: CARDIOLOGY | Facility: HOSPITAL | Age: 40
End: 2025-02-09
Payer: MEDICAID

## 2025-02-09 PROBLEM — A41.9 SEPSIS: Status: ACTIVE | Noted: 2025-02-09

## 2025-02-09 PROBLEM — L03.116 CELLULITIS OF LEFT LOWER EXTREMITY: Status: ACTIVE | Noted: 2025-02-09

## 2025-02-09 LAB
ALBUMIN SERPL-MCNC: 3.5 G/DL (ref 3.5–5.2)
ALBUMIN SERPL-MCNC: 4.4 G/DL (ref 3.5–5.2)
ALBUMIN/GLOB SERPL: 1 G/DL
ALBUMIN/GLOB SERPL: 1 G/DL
ALP SERPL-CCNC: 115 U/L (ref 39–117)
ALP SERPL-CCNC: 85 U/L (ref 39–117)
ALT SERPL W P-5'-P-CCNC: 15 U/L (ref 1–33)
ALT SERPL W P-5'-P-CCNC: 17 U/L (ref 1–33)
ANION GAP SERPL CALCULATED.3IONS-SCNC: 12 MMOL/L (ref 5–15)
ANION GAP SERPL CALCULATED.3IONS-SCNC: 13 MMOL/L (ref 5–15)
AST SERPL-CCNC: 16 U/L (ref 1–32)
AST SERPL-CCNC: 18 U/L (ref 1–32)
BASOPHILS # BLD AUTO: 0.03 10*3/MM3 (ref 0–0.2)
BASOPHILS NFR BLD AUTO: 0.2 % (ref 0–1.5)
BH CV LOW VAS LEFT MID FEMORAL SPONT: 1
BH CV LOWER VASCULAR LEFT COMMON FEMORAL AUGMENT: NORMAL
BH CV LOWER VASCULAR LEFT COMMON FEMORAL COMPETENT: NORMAL
BH CV LOWER VASCULAR LEFT COMMON FEMORAL COMPRESS: NORMAL
BH CV LOWER VASCULAR LEFT COMMON FEMORAL PHASIC: NORMAL
BH CV LOWER VASCULAR LEFT COMMON FEMORAL SPONT: NORMAL
BH CV LOWER VASCULAR LEFT DISTAL FEMORAL COMPRESS: NORMAL
BH CV LOWER VASCULAR LEFT GASTRONEMIUS COMPRESS: NORMAL
BH CV LOWER VASCULAR LEFT GREATER SAPH AK COMPRESS: NORMAL
BH CV LOWER VASCULAR LEFT GREATER SAPH BK COMPRESS: NORMAL
BH CV LOWER VASCULAR LEFT LESSER SAPH COMPRESS: NORMAL
BH CV LOWER VASCULAR LEFT MID FEMORAL AUGMENT: NORMAL
BH CV LOWER VASCULAR LEFT MID FEMORAL COMPETENT: NORMAL
BH CV LOWER VASCULAR LEFT MID FEMORAL COMPRESS: NORMAL
BH CV LOWER VASCULAR LEFT MID FEMORAL PHASIC: NORMAL
BH CV LOWER VASCULAR LEFT MID FEMORAL SPONT: NORMAL
BH CV LOWER VASCULAR LEFT MID FEMORAL THROMBUS: NORMAL
BH CV LOWER VASCULAR LEFT PERONEAL COMPRESS: NORMAL
BH CV LOWER VASCULAR LEFT POPLITEAL AUGMENT: NORMAL
BH CV LOWER VASCULAR LEFT POPLITEAL COMPETENT: NORMAL
BH CV LOWER VASCULAR LEFT POPLITEAL COMPRESS: NORMAL
BH CV LOWER VASCULAR LEFT POPLITEAL PHASIC: NORMAL
BH CV LOWER VASCULAR LEFT POPLITEAL SPONT: NORMAL
BH CV LOWER VASCULAR LEFT POSTERIOR TIBIAL COMPRESS: NORMAL
BH CV LOWER VASCULAR LEFT PROXIMAL FEMORAL COMPRESS: NORMAL
BH CV LOWER VASCULAR LEFT SAPHENOFEMORAL JUNCTION COMPRESS: NORMAL
BH CV LOWER VASCULAR RIGHT COMMON FEMORAL AUGMENT: NORMAL
BH CV LOWER VASCULAR RIGHT COMMON FEMORAL COMPETENT: NORMAL
BH CV LOWER VASCULAR RIGHT COMMON FEMORAL COMPRESS: NORMAL
BH CV LOWER VASCULAR RIGHT COMMON FEMORAL PHASIC: NORMAL
BH CV LOWER VASCULAR RIGHT COMMON FEMORAL SPONT: NORMAL
BILIRUB SERPL-MCNC: 0.4 MG/DL (ref 0–1.2)
BILIRUB SERPL-MCNC: 0.7 MG/DL (ref 0–1.2)
BUN SERPL-MCNC: 13 MG/DL (ref 6–20)
BUN SERPL-MCNC: 17 MG/DL (ref 6–20)
BUN/CREAT SERPL: 14.1 (ref 7–25)
BUN/CREAT SERPL: 16.3 (ref 7–25)
CALCIUM SPEC-SCNC: 10 MG/DL (ref 8.6–10.5)
CALCIUM SPEC-SCNC: 8.7 MG/DL (ref 8.6–10.5)
CHLORIDE SERPL-SCNC: 97 MMOL/L (ref 98–107)
CHLORIDE SERPL-SCNC: 97 MMOL/L (ref 98–107)
CO2 SERPL-SCNC: 22 MMOL/L (ref 22–29)
CO2 SERPL-SCNC: 24 MMOL/L (ref 22–29)
CREAT SERPL-MCNC: 0.92 MG/DL (ref 0.57–1)
CREAT SERPL-MCNC: 1.04 MG/DL (ref 0.57–1)
D DIMER PPP FEU-MCNC: 0.73 MCGFEU/ML (ref 0–0.5)
D-LACTATE SERPL-SCNC: 1.8 MMOL/L (ref 0.5–2)
DEPRECATED RDW RBC AUTO: 45 FL (ref 37–54)
DEPRECATED RDW RBC AUTO: 45.3 FL (ref 37–54)
EGFRCR SERPLBLD CKD-EPI 2021: 70.7 ML/MIN/1.73
EGFRCR SERPLBLD CKD-EPI 2021: 81.9 ML/MIN/1.73
EOSINOPHIL # BLD AUTO: 0.01 10*3/MM3 (ref 0–0.4)
EOSINOPHIL NFR BLD AUTO: 0.1 % (ref 0.3–6.2)
ERYTHROCYTE [DISTWIDTH] IN BLOOD BY AUTOMATED COUNT: 12.6 % (ref 12.3–15.4)
ERYTHROCYTE [DISTWIDTH] IN BLOOD BY AUTOMATED COUNT: 12.7 % (ref 12.3–15.4)
GLOBULIN UR ELPH-MCNC: 3.5 GM/DL
GLOBULIN UR ELPH-MCNC: 4.6 GM/DL
GLUCOSE SERPL-MCNC: 108 MG/DL (ref 65–99)
GLUCOSE SERPL-MCNC: 145 MG/DL (ref 65–99)
HCG SERPL QL: NEGATIVE
HCT VFR BLD AUTO: 38.5 % (ref 34–46.6)
HCT VFR BLD AUTO: 44.6 % (ref 34–46.6)
HGB BLD-MCNC: 12.5 G/DL (ref 12–15.9)
HGB BLD-MCNC: 15.1 G/DL (ref 12–15.9)
IMM GRANULOCYTES # BLD AUTO: 0.1 10*3/MM3 (ref 0–0.05)
IMM GRANULOCYTES NFR BLD AUTO: 0.5 % (ref 0–0.5)
LYMPHOCYTES # BLD AUTO: 1.85 10*3/MM3 (ref 0.7–3.1)
LYMPHOCYTES NFR BLD AUTO: 9.4 % (ref 19.6–45.3)
MCH RBC QN AUTO: 31.7 PG (ref 26.6–33)
MCH RBC QN AUTO: 32.8 PG (ref 26.6–33)
MCHC RBC AUTO-ENTMCNC: 32.5 G/DL (ref 31.5–35.7)
MCHC RBC AUTO-ENTMCNC: 33.9 G/DL (ref 31.5–35.7)
MCV RBC AUTO: 97 FL (ref 79–97)
MCV RBC AUTO: 97.7 FL (ref 79–97)
MONOCYTES # BLD AUTO: 0.4 10*3/MM3 (ref 0.1–0.9)
MONOCYTES NFR BLD AUTO: 2 % (ref 5–12)
NEUTROPHILS NFR BLD AUTO: 17.24 10*3/MM3 (ref 1.7–7)
NEUTROPHILS NFR BLD AUTO: 87.8 % (ref 42.7–76)
NRBC BLD AUTO-RTO: 0 /100 WBC (ref 0–0.2)
PLATELET # BLD AUTO: 189 10*3/MM3 (ref 140–450)
PLATELET # BLD AUTO: 239 10*3/MM3 (ref 140–450)
PMV BLD AUTO: 10.9 FL (ref 6–12)
PMV BLD AUTO: 11.4 FL (ref 6–12)
POTASSIUM SERPL-SCNC: 2.9 MMOL/L (ref 3.5–5.2)
POTASSIUM SERPL-SCNC: 3.4 MMOL/L (ref 3.5–5.2)
PROT SERPL-MCNC: 7 G/DL (ref 6–8.5)
PROT SERPL-MCNC: 9 G/DL (ref 6–8.5)
RBC # BLD AUTO: 3.94 10*6/MM3 (ref 3.77–5.28)
RBC # BLD AUTO: 4.6 10*6/MM3 (ref 3.77–5.28)
SODIUM SERPL-SCNC: 131 MMOL/L (ref 136–145)
SODIUM SERPL-SCNC: 134 MMOL/L (ref 136–145)
WBC NRBC COR # BLD AUTO: 16.53 10*3/MM3 (ref 3.4–10.8)
WBC NRBC COR # BLD AUTO: 19.63 10*3/MM3 (ref 3.4–10.8)

## 2025-02-09 PROCEDURE — 25010000002 VANCOMYCIN 10 G RECONSTITUTED SOLUTION: Performed by: EMERGENCY MEDICINE

## 2025-02-09 PROCEDURE — 85025 COMPLETE CBC W/AUTO DIFF WBC: CPT | Performed by: EMERGENCY MEDICINE

## 2025-02-09 PROCEDURE — 85379 FIBRIN DEGRADATION QUANT: CPT | Performed by: NURSE PRACTITIONER

## 2025-02-09 PROCEDURE — 25010000002 ENOXAPARIN PER 10 MG: Performed by: HOSPITALIST

## 2025-02-09 PROCEDURE — 25010000002 VANCOMYCIN HCL 1.25 G RECONSTITUTED SOLUTION 1 EACH VIAL: Performed by: HOSPITALIST

## 2025-02-09 PROCEDURE — 96366 THER/PROPH/DIAG IV INF ADDON: CPT

## 2025-02-09 PROCEDURE — 80053 COMPREHEN METABOLIC PANEL: CPT | Performed by: NURSE PRACTITIONER

## 2025-02-09 PROCEDURE — 25010000002 CEFTRIAXONE PER 250 MG: Performed by: NURSE PRACTITIONER

## 2025-02-09 PROCEDURE — 83605 ASSAY OF LACTIC ACID: CPT | Performed by: EMERGENCY MEDICINE

## 2025-02-09 PROCEDURE — 25810000003 SODIUM CHLORIDE 0.9 % SOLUTION: Performed by: EMERGENCY MEDICINE

## 2025-02-09 PROCEDURE — 85027 COMPLETE CBC AUTOMATED: CPT | Performed by: NURSE PRACTITIONER

## 2025-02-09 PROCEDURE — 25010000002 CEFTRIAXONE PER 250 MG: Performed by: EMERGENCY MEDICINE

## 2025-02-09 PROCEDURE — 36415 COLL VENOUS BLD VENIPUNCTURE: CPT | Performed by: NURSE PRACTITIONER

## 2025-02-09 PROCEDURE — G0378 HOSPITAL OBSERVATION PER HR: HCPCS

## 2025-02-09 PROCEDURE — 25810000003 SODIUM CHLORIDE 0.9 % SOLUTION: Performed by: NURSE PRACTITIONER

## 2025-02-09 PROCEDURE — 93971 EXTREMITY STUDY: CPT

## 2025-02-09 PROCEDURE — 96367 TX/PROPH/DG ADDL SEQ IV INF: CPT

## 2025-02-09 PROCEDURE — 96365 THER/PROPH/DIAG IV INF INIT: CPT

## 2025-02-09 PROCEDURE — 96376 TX/PRO/DX INJ SAME DRUG ADON: CPT

## 2025-02-09 PROCEDURE — 73590 X-RAY EXAM OF LOWER LEG: CPT

## 2025-02-09 PROCEDURE — 87040 BLOOD CULTURE FOR BACTERIA: CPT | Performed by: EMERGENCY MEDICINE

## 2025-02-09 PROCEDURE — 96375 TX/PRO/DX INJ NEW DRUG ADDON: CPT

## 2025-02-09 PROCEDURE — 63710000001 DIPHENHYDRAMINE PER 50 MG: Performed by: HOSPITALIST

## 2025-02-09 PROCEDURE — 93971 EXTREMITY STUDY: CPT | Performed by: SURGERY

## 2025-02-09 PROCEDURE — 25010000002 ONDANSETRON PER 1 MG: Performed by: NURSE PRACTITIONER

## 2025-02-09 PROCEDURE — 96372 THER/PROPH/DIAG INJ SC/IM: CPT

## 2025-02-09 PROCEDURE — 84703 CHORIONIC GONADOTROPIN ASSAY: CPT | Performed by: EMERGENCY MEDICINE

## 2025-02-09 PROCEDURE — 25810000003 SODIUM CHLORIDE 0.9 % SOLUTION 250 ML FLEX CONT: Performed by: HOSPITALIST

## 2025-02-09 PROCEDURE — 80053 COMPREHEN METABOLIC PANEL: CPT | Performed by: EMERGENCY MEDICINE

## 2025-02-09 RX ORDER — BISACODYL 5 MG/1
5 TABLET, DELAYED RELEASE ORAL DAILY PRN
Status: DISCONTINUED | OUTPATIENT
Start: 2025-02-09 | End: 2025-02-12 | Stop reason: HOSPADM

## 2025-02-09 RX ORDER — PERPHENAZINE 2 MG/1
2 TABLET ORAL 2 TIMES DAILY
Status: DISCONTINUED | OUTPATIENT
Start: 2025-02-09 | End: 2025-02-12 | Stop reason: HOSPADM

## 2025-02-09 RX ORDER — OXYCODONE AND ACETAMINOPHEN 5; 325 MG/1; MG/1
1 TABLET ORAL ONCE
Status: COMPLETED | OUTPATIENT
Start: 2025-02-09 | End: 2025-02-09

## 2025-02-09 RX ORDER — SODIUM CHLORIDE 9 MG/ML
40 INJECTION, SOLUTION INTRAVENOUS AS NEEDED
Status: DISCONTINUED | OUTPATIENT
Start: 2025-02-09 | End: 2025-02-12 | Stop reason: HOSPADM

## 2025-02-09 RX ORDER — NITROGLYCERIN 0.4 MG/1
0.4 TABLET SUBLINGUAL
Status: DISCONTINUED | OUTPATIENT
Start: 2025-02-09 | End: 2025-02-09

## 2025-02-09 RX ORDER — FAMOTIDINE 20 MG/1
20 TABLET, FILM COATED ORAL 2 TIMES DAILY PRN
Status: DISCONTINUED | OUTPATIENT
Start: 2025-02-09 | End: 2025-02-12 | Stop reason: HOSPADM

## 2025-02-09 RX ORDER — BISACODYL 10 MG
10 SUPPOSITORY, RECTAL RECTAL DAILY PRN
Status: DISCONTINUED | OUTPATIENT
Start: 2025-02-09 | End: 2025-02-12 | Stop reason: HOSPADM

## 2025-02-09 RX ORDER — ACETAMINOPHEN 160 MG/5ML
650 SOLUTION ORAL EVERY 4 HOURS PRN
Status: DISCONTINUED | OUTPATIENT
Start: 2025-02-09 | End: 2025-02-10

## 2025-02-09 RX ORDER — BUSPIRONE HYDROCHLORIDE 10 MG/1
10 TABLET ORAL 3 TIMES DAILY
Status: DISCONTINUED | OUTPATIENT
Start: 2025-02-09 | End: 2025-02-12 | Stop reason: HOSPADM

## 2025-02-09 RX ORDER — DOCUSATE SODIUM 100 MG/1
100 CAPSULE, LIQUID FILLED ORAL 2 TIMES DAILY
Status: DISCONTINUED | OUTPATIENT
Start: 2025-02-09 | End: 2025-02-11

## 2025-02-09 RX ORDER — ACETAMINOPHEN 325 MG/1
650 TABLET ORAL EVERY 4 HOURS PRN
Status: DISCONTINUED | OUTPATIENT
Start: 2025-02-09 | End: 2025-02-10

## 2025-02-09 RX ORDER — SODIUM CHLORIDE 9 MG/ML
100 INJECTION, SOLUTION INTRAVENOUS CONTINUOUS
Status: DISCONTINUED | OUTPATIENT
Start: 2025-02-09 | End: 2025-02-09

## 2025-02-09 RX ORDER — FLUTICASONE PROPIONATE 50 MCG
1 SPRAY, SUSPENSION (ML) NASAL DAILY
Status: DISCONTINUED | OUTPATIENT
Start: 2025-02-09 | End: 2025-02-12 | Stop reason: HOSPADM

## 2025-02-09 RX ORDER — OXYCODONE AND ACETAMINOPHEN 10; 325 MG/1; MG/1
1 TABLET ORAL EVERY 4 HOURS PRN
Status: DISCONTINUED | OUTPATIENT
Start: 2025-02-09 | End: 2025-02-10

## 2025-02-09 RX ORDER — SODIUM CHLORIDE 0.9 % (FLUSH) 0.9 %
10 SYRINGE (ML) INJECTION EVERY 12 HOURS SCHEDULED
Status: DISCONTINUED | OUTPATIENT
Start: 2025-02-09 | End: 2025-02-12 | Stop reason: HOSPADM

## 2025-02-09 RX ORDER — AZITHROMYCIN 250 MG/1
1000 TABLET, FILM COATED ORAL ONCE
Status: COMPLETED | OUTPATIENT
Start: 2025-02-09 | End: 2025-02-09

## 2025-02-09 RX ORDER — ATORVASTATIN CALCIUM 20 MG/1
20 TABLET, FILM COATED ORAL NIGHTLY
Status: DISCONTINUED | OUTPATIENT
Start: 2025-02-09 | End: 2025-02-12 | Stop reason: HOSPADM

## 2025-02-09 RX ORDER — TRAZODONE HYDROCHLORIDE 100 MG/1
300 TABLET ORAL NIGHTLY
Status: DISCONTINUED | OUTPATIENT
Start: 2025-02-09 | End: 2025-02-12 | Stop reason: HOSPADM

## 2025-02-09 RX ORDER — AMOXICILLIN 250 MG
2 CAPSULE ORAL 2 TIMES DAILY PRN
Status: DISCONTINUED | OUTPATIENT
Start: 2025-02-09 | End: 2025-02-12 | Stop reason: HOSPADM

## 2025-02-09 RX ORDER — CETIRIZINE HYDROCHLORIDE 10 MG/1
10 TABLET ORAL DAILY
Status: DISCONTINUED | OUTPATIENT
Start: 2025-02-09 | End: 2025-02-12 | Stop reason: HOSPADM

## 2025-02-09 RX ORDER — ENOXAPARIN SODIUM 100 MG/ML
40 INJECTION SUBCUTANEOUS EVERY 24 HOURS
Status: DISCONTINUED | OUTPATIENT
Start: 2025-02-09 | End: 2025-02-12 | Stop reason: HOSPADM

## 2025-02-09 RX ORDER — POLYETHYLENE GLYCOL 3350 17 G/17G
17 POWDER, FOR SOLUTION ORAL DAILY PRN
Status: DISCONTINUED | OUTPATIENT
Start: 2025-02-09 | End: 2025-02-12 | Stop reason: HOSPADM

## 2025-02-09 RX ORDER — OLANZAPINE 5 MG/1
10 TABLET ORAL NIGHTLY
Status: DISCONTINUED | OUTPATIENT
Start: 2025-02-09 | End: 2025-02-12 | Stop reason: HOSPADM

## 2025-02-09 RX ORDER — LANOLIN ALCOHOL/MO/W.PET/CERES
50 CREAM (GRAM) TOPICAL DAILY
Status: DISCONTINUED | OUTPATIENT
Start: 2025-02-09 | End: 2025-02-12 | Stop reason: HOSPADM

## 2025-02-09 RX ORDER — ACETAMINOPHEN 650 MG/1
650 SUPPOSITORY RECTAL EVERY 4 HOURS PRN
Status: DISCONTINUED | OUTPATIENT
Start: 2025-02-09 | End: 2025-02-10

## 2025-02-09 RX ORDER — METOPROLOL SUCCINATE 25 MG/1
25 TABLET, EXTENDED RELEASE ORAL DAILY
Status: DISCONTINUED | OUTPATIENT
Start: 2025-02-09 | End: 2025-02-12

## 2025-02-09 RX ORDER — ONDANSETRON 2 MG/ML
4 INJECTION INTRAMUSCULAR; INTRAVENOUS EVERY 6 HOURS PRN
Status: DISCONTINUED | OUTPATIENT
Start: 2025-02-09 | End: 2025-02-12 | Stop reason: HOSPADM

## 2025-02-09 RX ORDER — CYCLOBENZAPRINE HCL 10 MG
10 TABLET ORAL 3 TIMES DAILY
Status: DISCONTINUED | OUTPATIENT
Start: 2025-02-09 | End: 2025-02-09

## 2025-02-09 RX ORDER — TOPIRAMATE 100 MG/1
200 TABLET, FILM COATED ORAL NIGHTLY
Status: DISCONTINUED | OUTPATIENT
Start: 2025-02-09 | End: 2025-02-12 | Stop reason: HOSPADM

## 2025-02-09 RX ORDER — DIPHENHYDRAMINE HCL 25 MG
25 CAPSULE ORAL EVERY 6 HOURS
Status: DISCONTINUED | OUTPATIENT
Start: 2025-02-09 | End: 2025-02-12 | Stop reason: HOSPADM

## 2025-02-09 RX ORDER — SODIUM CHLORIDE 0.9 % (FLUSH) 0.9 %
10 SYRINGE (ML) INJECTION AS NEEDED
Status: DISCONTINUED | OUTPATIENT
Start: 2025-02-09 | End: 2025-02-12 | Stop reason: HOSPADM

## 2025-02-09 RX ORDER — ENOXAPARIN SODIUM 150 MG/ML
1 INJECTION SUBCUTANEOUS ONCE
Status: DISCONTINUED | OUTPATIENT
Start: 2025-02-09 | End: 2025-02-09

## 2025-02-09 RX ADMIN — DIPHENHYDRAMINE HYDROCHLORIDE 25 MG: 25 CAPSULE ORAL at 21:32

## 2025-02-09 RX ADMIN — TOPIRAMATE 200 MG: 100 TABLET, FILM COATED ORAL at 21:26

## 2025-02-09 RX ADMIN — AZITHROMYCIN 1000 MG: 250 TABLET, FILM COATED ORAL at 11:19

## 2025-02-09 RX ADMIN — TRAZODONE HYDROCHLORIDE 300 MG: 100 TABLET ORAL at 21:26

## 2025-02-09 RX ADMIN — ENOXAPARIN SODIUM 40 MG: 100 INJECTION SUBCUTANEOUS at 14:05

## 2025-02-09 RX ADMIN — VANCOMYCIN HYDROCHLORIDE 1250 MG: 1.25 INJECTION, POWDER, LYOPHILIZED, FOR SOLUTION INTRAVENOUS at 14:22

## 2025-02-09 RX ADMIN — VANCOMYCIN HYDROCHLORIDE 2250 MG: 10 INJECTION, POWDER, LYOPHILIZED, FOR SOLUTION INTRAVENOUS at 03:17

## 2025-02-09 RX ADMIN — CETIRIZINE HYDROCHLORIDE 10 MG: 10 TABLET, FILM COATED ORAL at 17:01

## 2025-02-09 RX ADMIN — CEFTRIAXONE 2000 MG: 2 INJECTION, POWDER, FOR SOLUTION INTRAMUSCULAR; INTRAVENOUS at 02:25

## 2025-02-09 RX ADMIN — OLANZAPINE 10 MG: 5 TABLET, FILM COATED ORAL at 21:26

## 2025-02-09 RX ADMIN — SODIUM CHLORIDE 100 ML/HR: 9 INJECTION, SOLUTION INTRAVENOUS at 06:02

## 2025-02-09 RX ADMIN — OXYCODONE AND ACETAMINOPHEN 1 TABLET: 5; 325 TABLET ORAL at 00:39

## 2025-02-09 RX ADMIN — ONDANSETRON 4 MG: 2 INJECTION INTRAMUSCULAR; INTRAVENOUS at 19:31

## 2025-02-09 RX ADMIN — ATORVASTATIN CALCIUM 20 MG: 20 TABLET, FILM COATED ORAL at 21:26

## 2025-02-09 RX ADMIN — Medication 50 MG: at 17:01

## 2025-02-09 RX ADMIN — CEFTRIAXONE 2000 MG: 2 INJECTION, POWDER, FOR SOLUTION INTRAMUSCULAR; INTRAVENOUS at 18:39

## 2025-02-09 RX ADMIN — ONDANSETRON 4 MG: 2 INJECTION INTRAMUSCULAR; INTRAVENOUS at 10:19

## 2025-02-09 RX ADMIN — OXYCODONE AND ACETAMINOPHEN 1 TABLET: 325; 10 TABLET ORAL at 19:31

## 2025-02-09 RX ADMIN — OXYCODONE AND ACETAMINOPHEN 1 TABLET: 325; 10 TABLET ORAL at 09:52

## 2025-02-09 RX ADMIN — BUSPIRONE HYDROCHLORIDE 10 MG: 10 TABLET ORAL at 21:26

## 2025-02-09 RX ADMIN — Medication 10 ML: at 18:40

## 2025-02-09 RX ADMIN — BUSPIRONE HYDROCHLORIDE 10 MG: 10 TABLET ORAL at 17:01

## 2025-02-09 RX ADMIN — PERPHENAZINE 2 MG: 2 TABLET, FILM COATED ORAL at 17:02

## 2025-02-09 RX ADMIN — OXYCODONE AND ACETAMINOPHEN 1 TABLET: 325; 10 TABLET ORAL at 14:20

## 2025-02-09 RX ADMIN — TIZANIDINE 2 MG: 4 TABLET ORAL at 21:26

## 2025-02-09 RX ADMIN — DIPHENHYDRAMINE HYDROCHLORIDE 25 MG: 25 CAPSULE ORAL at 17:01

## 2025-02-09 RX ADMIN — OXYCODONE AND ACETAMINOPHEN 1 TABLET: 5; 325 TABLET ORAL at 02:30

## 2025-02-09 NOTE — ED NOTES
.Nursing report ED to floor  Laya Castro  39 y.o.  female    HPI :  HPI  Stated Reason for Visit: Pt to ED via EMS complaining of left leg pain. Left leg red, swollen x1 day. Pt also copmlains of fall today. Pt denies loc, thinners, and hitting head.  History Obtained From: EMS    Chief Complaint  Chief Complaint   Patient presents with    Leg Swelling       Admitting doctor:   Claudio Luna MD    Admitting diagnosis:   The primary encounter diagnosis was Sepsis without acute organ dysfunction, due to unspecified organism. A diagnosis of Left leg cellulitis was also pertinent to this visit.    Code status:   Current Code Status       Date Active Code Status Order ID Comments User Context       Prior            Allergies:   Haloperidol, Promethazine, Adhesive tape, and Diphenhydramine-acetaminophen    Isolation:   No active isolations    Intake and Output  No intake or output data in the 24 hours ending 02/09/25 0327    Weight:       02/08/25  2310   Weight: 118 kg (260 lb)       Most recent vitals:   Vitals:    02/09/25 0005 02/09/25 0039 02/09/25 0119 02/09/25 0230   BP:   111/70 111/95   Pulse: 102  105 91   Resp:   20 18   Temp:  (!) 101.2 °F (38.4 °C)     TempSrc:  Tympanic     SpO2: 100%  99% 93%   Weight:       Height:           Active LDAs/IV Access:   Lines, Drains & Airways       Active LDAs       Name Placement date Placement time Site Days    Peripheral IV 02/09/25 0027 Right Antecubital 02/09/25  0027  Antecubital  less than 1                    Labs (abnormal labs have a star):   Labs Reviewed   COMPREHENSIVE METABOLIC PANEL - Abnormal; Notable for the following components:       Result Value    Glucose 108 (*)     Creatinine 1.04 (*)     Sodium 134 (*)     Potassium 3.4 (*)     Chloride 97 (*)     Total Protein 9.0 (*)     All other components within normal limits    Narrative:     GFR Categories in Chronic Kidney Disease (CKD)      GFR Category          GFR (mL/min/1.73)     Interpretation  G1                     90 or greater         Normal or high (1)  G2                      60-89                Mild decrease (1)  G3a                   45-59                Mild to moderate decrease  G3b                   30-44                Moderate to severe decrease  G4                    15-29                Severe decrease  G5                    14 or less           Kidney failure          (1)In the absence of evidence of kidney disease, neither GFR category G1 or G2 fulfill the criteria for CKD.    eGFR calculation 2021 CKD-EPI creatinine equation, which does not include race as a factor   CBC WITH AUTO DIFFERENTIAL - Abnormal; Notable for the following components:    WBC 19.63 (*)     Neutrophil % 87.8 (*)     Lymphocyte % 9.4 (*)     Monocyte % 2.0 (*)     Eosinophil % 0.1 (*)     Neutrophils, Absolute 17.24 (*)     Immature Grans, Absolute 0.10 (*)     All other components within normal limits   LACTIC ACID, PLASMA - Normal   HCG, SERUM, QUALITATIVE - Normal   BLOOD CULTURE   BLOOD CULTURE   CHLAMYDIA TRACHOMATIS, NEISSERIA GONORRHOEAE, PCR   CBC AND DIFFERENTIAL    Narrative:     The following orders were created for panel order CBC & Differential.  Procedure                               Abnormality         Status                     ---------                               -----------         ------                     CBC Auto Differential[[id number]]        Abnormal            Final result                 Please view results for these tests on the individual orders.       EKG:   No orders to display       Meds given in ED:   Medications   vancomycin 2250 mg/500 mL 0.9% NS IVPB ([facility]) (2,250 mg Intravenous New Bag 2/9/25 0317)   oxyCODONE-acetaminophen (PERCOCET) 5-325 MG per tablet 1 tablet (1 tablet Oral Given 2/9/25 0039)   cefTRIAXone (ROCEPHIN) 2,000 mg in sodium chloride 0.9 % 100 mL MBP (0 mg Intravenous Stopped 2/9/25 0317)   oxyCODONE-acetaminophen (PERCOCET) 5-325 MG per tablet 1  tablet (1 tablet Oral Given 2/9/25 0230)       Imaging results:  XR Tibia Fibula 2 View Left    Result Date: 2/9/2025   No acute abnormality.   This report was finalized on 2/9/2025 1:12 AM by Dr. Cyrus Narayan M.D on Workstation: [id number]         Social issues:   Social History     Socioeconomic History    Marital status: Single   Tobacco Use    Smoking status: Every Day     Current packs/day: 1.00     Types: Cigarettes    Smokeless tobacco: Never   Vaping Use    Vaping status: Every Day    Substances: Nicotine    Passive vaping exposure: Yes   Substance and Sexual Activity    Alcohol use: Not Currently     Alcohol/week: 2.0 standard drinks of alcohol     Types: 2 Glasses of wine per week     Comment: occ.    Drug use: Yes     Types: Amphetamines, Cocaine(coke), Marijuana     Comment: smokes weed    Sexual activity: Not Currently       Peripheral Neurovascular  Peripheral Neurovascular (Adult)  Peripheral Neurovascular WDL: .WDL except, neurovascular assessment lower  Edema  Edema: leg, left  Leg, Left Edema: other (see comments) (left leg swelling)  LLE Neurovascular Assessment  Temperature LLE: hot  Color LLE: red  Sensation LLE: tenderness present  RLE Neurovascular Assessment  Temperature RLE: warm  Color RLE: no discoloration    Neuro Cognitive  Neuro Cognitive (Adult)  Cognitive/Neuro/Behavioral WDL: .WDL except, mood/behavior  Mood/Behavior: anxious, agitated, uncooperative    Learning  Learning Assessment  Learning Readiness and Ability: no barriers identified  Education Provided  Person Taught: patient  Teaching Method: verbal instruction  Education Outcome Evaluation: needs reinforcement    Respiratory  Respiratory WDL  Respiratory WDL: WDL    Abdominal Pain       Pain Assessments  Pain (Adult)  (0-10) Pain Rating: Rest: 8  (0-10) Pain Rating: Activity: 10  Pain Location: extremity  Pain Side/Orientation: left  Pain Radiation to: leg, left  Response to Pain Interventions: intervention not effective  per patient    NIH Stroke Scale       Gisele Balderas RN  02/09/25 03:27 EST

## 2025-02-09 NOTE — ED NOTES
"Attempted to obtain lab work. While inserting butterfly needle patient screamed \"fuck you don't go digging, what do you even need all these labs for\". I immediately discontinued obtaining lab work. Pt told she would be escorted out to lob and that she could not speak to staff that way. When pt was rolled back to [facility] in wheelchair she began to scream \"fuck you and your pregnant ass, don't go digging.\" Pt again educated that she cannot speak to staff that way and that other patients in the ER do not want to hear that language. Pt continued cursing. Security informed.   "
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
## 2025-02-09 NOTE — PROGRESS NOTES
"[facility] Clinical Pharmacy Services: Vancomycin Pharmacokinetic Initial Consult Note    Laya Castro is a 39 y.o. female who is on day 1 of pharmacy to dose vancomycin.    Indication: Skin and Soft Tissue  Consulting Provider: Dr. Luna  Planned Duration of Therapy: 7 days  Loading Dose Ordered or Given: 2250 mg on 2/9 at 0317  MRSA PCR performed:  ; Result:    Culture/Source: 2/9 B/C x 2  in process    Target: -600 mg/L.hr           Vitals/Labs  Ht: 170.2 cm (67\"); Wt: 110 kg (243 lb 2.7 oz)  Temp Readings from Last 1 Encounters:   02/09/25 100.5 °F (38.1 °C) (Oral)    Estimated Creatinine Clearance: 106 mL/min (by C-G formula based on SCr of 0.92 mg/dL).        Results from last 7 days   Lab Units 02/09/25  0628 02/09/25  0029   CREATININE mg/dL 0.92 1.04*   WBC 10*3/mm3 16.53* 19.63*     Assessment/Plan:    Vancomycin Dose:   1250 mg IV every  12  hours  Predictive AUC level for the dose ordered is 474 mg/L.hr, which is within the target of 400-600 mg/L.hr  Vanc Trough has been ordered for 2/10 at 1430     Pharmacy will follow patient's kidney function and will adjust doses and obtain levels as necessary. Thank you for involving pharmacy in this patient's care. Please contact pharmacy with any questions or concerns.                           Tammi Granda [facility]  Clinical Pharmacist    "

## 2025-02-09 NOTE — PLAN OF CARE
Goal Outcome Evaluation:  Plan of Care Reviewed With: patient        Progress: no change  Outcome Evaluation: On admission to  oriented patient to room, equipment, nd plan of care. IV abx and fluids as ordered. Left leg warm to touch, swollen, and red from knee down to ankle. Elevated LLE on pillows. VSS. Will monitor for needs.
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
## 2025-02-09 NOTE — SIGNIFICANT NOTE
Patient refused skilled PT evaluation despite education regarding importance of participating in PT sessions to address functional deficits. Will follow up as schedule permits

## 2025-02-10 ENCOUNTER — APPOINTMENT (OUTPATIENT)
Dept: CARDIOLOGY | Facility: HOSPITAL | Age: 40
End: 2025-02-10
Payer: MEDICAID

## 2025-02-10 PROBLEM — A41.9 SEPSIS WITH CUTANEOUS MANIFESTATIONS: Status: ACTIVE | Noted: 2025-02-10

## 2025-02-10 PROBLEM — I82.502 CHRONIC DEEP VEIN THROMBOSIS (DVT) OF LEFT LOWER EXTREMITY: Status: ACTIVE | Noted: 2020-01-18

## 2025-02-10 PROBLEM — F19.10 POLYSUBSTANCE ABUSE: Status: ACTIVE | Noted: 2025-02-10

## 2025-02-10 LAB
AMPHET+METHAMPHET UR QL: NEGATIVE
AMPHETAMINES UR QL: NEGATIVE
ANION GAP SERPL CALCULATED.3IONS-SCNC: 10 MMOL/L (ref 5–15)
BARBITURATES UR QL SCN: NEGATIVE
BENZODIAZ UR QL SCN: NEGATIVE
BH CV LOWER VASCULAR LEFT COMMON FEMORAL AUGMENT: NORMAL
BH CV LOWER VASCULAR LEFT COMMON FEMORAL COMPETENT: NORMAL
BH CV LOWER VASCULAR LEFT COMMON FEMORAL COMPRESS: NORMAL
BH CV LOWER VASCULAR LEFT COMMON FEMORAL PHASIC: NORMAL
BH CV LOWER VASCULAR LEFT COMMON FEMORAL SPONT: NORMAL
BH CV LOWER VASCULAR RIGHT COMMON FEMORAL AUGMENT: NORMAL
BH CV LOWER VASCULAR RIGHT COMMON FEMORAL COMPETENT: NORMAL
BH CV LOWER VASCULAR RIGHT COMMON FEMORAL COMPRESS: NORMAL
BH CV LOWER VASCULAR RIGHT COMMON FEMORAL PHASIC: NORMAL
BH CV LOWER VASCULAR RIGHT COMMON FEMORAL SPONT: NORMAL
BH CV LOWER VASCULAR RIGHT DISTAL FEMORAL COMPRESS: NORMAL
BH CV LOWER VASCULAR RIGHT GASTRONEMIUS COMPRESS: NORMAL
BH CV LOWER VASCULAR RIGHT GREATER SAPH AK COMPRESS: NORMAL
BH CV LOWER VASCULAR RIGHT GREATER SAPH BK COMPRESS: NORMAL
BH CV LOWER VASCULAR RIGHT LESSER SAPH COMPRESS: NORMAL
BH CV LOWER VASCULAR RIGHT MID FEMORAL AUGMENT: NORMAL
BH CV LOWER VASCULAR RIGHT MID FEMORAL COMPETENT: NORMAL
BH CV LOWER VASCULAR RIGHT MID FEMORAL COMPRESS: NORMAL
BH CV LOWER VASCULAR RIGHT MID FEMORAL PHASIC: NORMAL
BH CV LOWER VASCULAR RIGHT MID FEMORAL SPONT: NORMAL
BH CV LOWER VASCULAR RIGHT PERONEAL COMPRESS: NORMAL
BH CV LOWER VASCULAR RIGHT POPLITEAL AUGMENT: NORMAL
BH CV LOWER VASCULAR RIGHT POPLITEAL COMPETENT: NORMAL
BH CV LOWER VASCULAR RIGHT POPLITEAL COMPRESS: NORMAL
BH CV LOWER VASCULAR RIGHT POPLITEAL PHASIC: NORMAL
BH CV LOWER VASCULAR RIGHT POPLITEAL SPONT: NORMAL
BH CV LOWER VASCULAR RIGHT POSTERIOR TIBIAL COMPRESS: NORMAL
BH CV LOWER VASCULAR RIGHT PROFUNDA FEMORAL COMPRESS: NORMAL
BH CV LOWER VASCULAR RIGHT PROXIMAL FEMORAL COMPRESS: NORMAL
BH CV LOWER VASCULAR RIGHT SAPHENOFEMORAL JUNCTION COMPRESS: NORMAL
BUN SERPL-MCNC: 8 MG/DL (ref 6–20)
BUN/CREAT SERPL: 9.2 (ref 7–25)
BUPRENORPHINE SERPL-MCNC: NEGATIVE NG/ML
CALCIUM SPEC-SCNC: 8.4 MG/DL (ref 8.6–10.5)
CANNABINOIDS SERPL QL: NEGATIVE
CHLORIDE SERPL-SCNC: 105 MMOL/L (ref 98–107)
CO2 SERPL-SCNC: 21 MMOL/L (ref 22–29)
COCAINE UR QL: POSITIVE
CREAT SERPL-MCNC: 0.87 MG/DL (ref 0.57–1)
DEPRECATED RDW RBC AUTO: 42 FL (ref 37–54)
EGFRCR SERPLBLD CKD-EPI 2021: 87.6 ML/MIN/1.73
ERYTHROCYTE [DISTWIDTH] IN BLOOD BY AUTOMATED COUNT: 12.5 % (ref 12.3–15.4)
FENTANYL UR-MCNC: NEGATIVE NG/ML
GLUCOSE SERPL-MCNC: 96 MG/DL (ref 65–99)
HCT VFR BLD AUTO: 32.7 % (ref 34–46.6)
HGB BLD-MCNC: 11.2 G/DL (ref 12–15.9)
MAGNESIUM SERPL-MCNC: 2.1 MG/DL (ref 1.6–2.6)
MCH RBC QN AUTO: 32.5 PG (ref 26.6–33)
MCHC RBC AUTO-ENTMCNC: 34.3 G/DL (ref 31.5–35.7)
MCV RBC AUTO: 94.8 FL (ref 79–97)
METHADONE UR QL SCN: NEGATIVE
OPIATES UR QL: NEGATIVE
OXYCODONE UR QL SCN: POSITIVE
PCP UR QL SCN: NEGATIVE
PLATELET # BLD AUTO: 172 10*3/MM3 (ref 140–450)
PMV BLD AUTO: 11.3 FL (ref 6–12)
POTASSIUM SERPL-SCNC: 3.2 MMOL/L (ref 3.5–5.2)
POTASSIUM SERPL-SCNC: 3.8 MMOL/L (ref 3.5–5.2)
RBC # BLD AUTO: 3.45 10*6/MM3 (ref 3.77–5.28)
SODIUM SERPL-SCNC: 136 MMOL/L (ref 136–145)
TRICYCLICS UR QL SCN: NEGATIVE
VANCOMYCIN TROUGH SERPL-MCNC: 14.4 MCG/ML (ref 5–20)
WBC NRBC COR # BLD AUTO: 7.36 10*3/MM3 (ref 3.4–10.8)

## 2025-02-10 PROCEDURE — 25810000003 SODIUM CHLORIDE 0.9 % SOLUTION: Performed by: NURSE PRACTITIONER

## 2025-02-10 PROCEDURE — 25010000002 ENOXAPARIN PER 10 MG: Performed by: HOSPITALIST

## 2025-02-10 PROCEDURE — 63710000001 DIPHENHYDRAMINE PER 50 MG: Performed by: HOSPITALIST

## 2025-02-10 PROCEDURE — 96372 THER/PROPH/DIAG INJ SC/IM: CPT

## 2025-02-10 PROCEDURE — 93971 EXTREMITY STUDY: CPT

## 2025-02-10 PROCEDURE — 96361 HYDRATE IV INFUSION ADD-ON: CPT

## 2025-02-10 PROCEDURE — G0378 HOSPITAL OBSERVATION PER HR: HCPCS

## 2025-02-10 PROCEDURE — 83735 ASSAY OF MAGNESIUM: CPT | Performed by: HOSPITALIST

## 2025-02-10 PROCEDURE — 25010000002 CEFTRIAXONE PER 250 MG: Performed by: NURSE PRACTITIONER

## 2025-02-10 PROCEDURE — 85027 COMPLETE CBC AUTOMATED: CPT | Performed by: HOSPITALIST

## 2025-02-10 PROCEDURE — 93971 EXTREMITY STUDY: CPT | Performed by: SURGERY

## 2025-02-10 PROCEDURE — 99223 1ST HOSP IP/OBS HIGH 75: CPT | Performed by: INTERNAL MEDICINE

## 2025-02-10 PROCEDURE — 25810000003 SODIUM CHLORIDE 0.9 % SOLUTION 250 ML FLEX CONT: Performed by: HOSPITALIST

## 2025-02-10 PROCEDURE — 90791 PSYCH DIAGNOSTIC EVALUATION: CPT

## 2025-02-10 PROCEDURE — 80202 ASSAY OF VANCOMYCIN: CPT | Performed by: HOSPITALIST

## 2025-02-10 PROCEDURE — 80307 DRUG TEST PRSMV CHEM ANLYZR: CPT | Performed by: NURSE PRACTITIONER

## 2025-02-10 PROCEDURE — 96366 THER/PROPH/DIAG IV INF ADDON: CPT

## 2025-02-10 PROCEDURE — 80048 BASIC METABOLIC PNL TOTAL CA: CPT | Performed by: HOSPITALIST

## 2025-02-10 PROCEDURE — 84132 ASSAY OF SERUM POTASSIUM: CPT | Performed by: HOSPITALIST

## 2025-02-10 PROCEDURE — 25010000002 VANCOMYCIN HCL 1.25 G RECONSTITUTED SOLUTION 1 EACH VIAL: Performed by: HOSPITALIST

## 2025-02-10 RX ORDER — SODIUM CHLORIDE 9 MG/ML
100 INJECTION, SOLUTION INTRAVENOUS CONTINUOUS
Status: ACTIVE | OUTPATIENT
Start: 2025-02-10 | End: 2025-02-10

## 2025-02-10 RX ORDER — NICOTINE 21 MG/24HR
1 PATCH, TRANSDERMAL 24 HOURS TRANSDERMAL
Status: DISCONTINUED | OUTPATIENT
Start: 2025-02-10 | End: 2025-02-12 | Stop reason: HOSPADM

## 2025-02-10 RX ORDER — SODIUM CHLORIDE 9 MG/ML
100 INJECTION, SOLUTION INTRAVENOUS CONTINUOUS
Status: DISCONTINUED | OUTPATIENT
Start: 2025-02-10 | End: 2025-02-11

## 2025-02-10 RX ORDER — ACETAMINOPHEN 500 MG
1000 TABLET ORAL 3 TIMES DAILY
Status: DISCONTINUED | OUTPATIENT
Start: 2025-02-10 | End: 2025-02-12 | Stop reason: HOSPADM

## 2025-02-10 RX ORDER — POTASSIUM CHLORIDE 750 MG/1
40 TABLET, FILM COATED, EXTENDED RELEASE ORAL EVERY 4 HOURS
Status: COMPLETED | OUTPATIENT
Start: 2025-02-10 | End: 2025-02-10

## 2025-02-10 RX ORDER — OXYCODONE HYDROCHLORIDE 5 MG/1
10 TABLET ORAL EVERY 4 HOURS PRN
Status: DISCONTINUED | OUTPATIENT
Start: 2025-02-10 | End: 2025-02-12 | Stop reason: HOSPADM

## 2025-02-10 RX ADMIN — PERPHENAZINE 2 MG: 2 TABLET, FILM COATED ORAL at 12:56

## 2025-02-10 RX ADMIN — SODIUM CHLORIDE 100 ML/HR: 9 INJECTION, SOLUTION INTRAVENOUS at 00:37

## 2025-02-10 RX ADMIN — TOPIRAMATE 200 MG: 100 TABLET, FILM COATED ORAL at 21:30

## 2025-02-10 RX ADMIN — OLANZAPINE 10 MG: 5 TABLET, FILM COATED ORAL at 21:31

## 2025-02-10 RX ADMIN — DIPHENHYDRAMINE HYDROCHLORIDE 25 MG: 25 CAPSULE ORAL at 04:11

## 2025-02-10 RX ADMIN — ENOXAPARIN SODIUM 40 MG: 100 INJECTION SUBCUTANEOUS at 12:56

## 2025-02-10 RX ADMIN — DIPHENHYDRAMINE HYDROCHLORIDE 25 MG: 25 CAPSULE ORAL at 21:38

## 2025-02-10 RX ADMIN — DIPHENHYDRAMINE HYDROCHLORIDE 25 MG: 25 CAPSULE ORAL at 15:56

## 2025-02-10 RX ADMIN — CETIRIZINE HYDROCHLORIDE 10 MG: 10 TABLET, FILM COATED ORAL at 08:49

## 2025-02-10 RX ADMIN — Medication 10 ML: at 08:53

## 2025-02-10 RX ADMIN — VANCOMYCIN HYDROCHLORIDE 1250 MG: 1.25 INJECTION, POWDER, LYOPHILIZED, FOR SOLUTION INTRAVENOUS at 03:30

## 2025-02-10 RX ADMIN — BUSPIRONE HYDROCHLORIDE 10 MG: 10 TABLET ORAL at 21:30

## 2025-02-10 RX ADMIN — Medication 50 MG: at 08:49

## 2025-02-10 RX ADMIN — DIPHENHYDRAMINE HYDROCHLORIDE 25 MG: 25 CAPSULE ORAL at 09:15

## 2025-02-10 RX ADMIN — TRAZODONE HYDROCHLORIDE 300 MG: 100 TABLET ORAL at 21:30

## 2025-02-10 RX ADMIN — OXYCODONE HYDROCHLORIDE 10 MG: 5 TABLET ORAL at 23:32

## 2025-02-10 RX ADMIN — POTASSIUM CHLORIDE 40 MEQ: 750 TABLET, EXTENDED RELEASE ORAL at 12:56

## 2025-02-10 RX ADMIN — OXYCODONE AND ACETAMINOPHEN 1 TABLET: 325; 10 TABLET ORAL at 09:15

## 2025-02-10 RX ADMIN — OXYCODONE HYDROCHLORIDE 10 MG: 5 TABLET ORAL at 13:17

## 2025-02-10 RX ADMIN — OXYCODONE HYDROCHLORIDE 10 MG: 5 TABLET ORAL at 19:46

## 2025-02-10 RX ADMIN — BUSPIRONE HYDROCHLORIDE 10 MG: 10 TABLET ORAL at 08:49

## 2025-02-10 RX ADMIN — PERPHENAZINE 2 MG: 2 TABLET, FILM COATED ORAL at 21:31

## 2025-02-10 RX ADMIN — ACETAMINOPHEN 1000 MG: 500 TABLET, FILM COATED ORAL at 21:30

## 2025-02-10 RX ADMIN — ATORVASTATIN CALCIUM 20 MG: 20 TABLET, FILM COATED ORAL at 21:31

## 2025-02-10 RX ADMIN — POTASSIUM CHLORIDE 40 MEQ: 750 TABLET, EXTENDED RELEASE ORAL at 08:49

## 2025-02-10 RX ADMIN — TIZANIDINE 2 MG: 4 TABLET ORAL at 21:31

## 2025-02-10 RX ADMIN — SODIUM CHLORIDE 500 ML: 9 INJECTION, SOLUTION INTRAVENOUS at 00:37

## 2025-02-10 RX ADMIN — CEFTRIAXONE 2000 MG: 2 INJECTION, POWDER, FOR SOLUTION INTRAMUSCULAR; INTRAVENOUS at 19:16

## 2025-02-10 RX ADMIN — BUSPIRONE HYDROCHLORIDE 10 MG: 10 TABLET ORAL at 15:56

## 2025-02-10 NOTE — PLAN OF CARE
Goal Outcome Evaluation:  Plan of Care Reviewed With: patient           Outcome Evaluation: A&O x4. Redness and edema to LLE. Encouraged elevation on several pillows. IV Vanc and Rocephin. Asst x1 to BSC. 500mL NS bolus and then NS @ 125/hr for low BP. Voiding. Two episodes of small fecal incont. Asks for drinks and snacks frequently.

## 2025-02-10 NOTE — CONSULTS
"[facility] attempted to evaluate patient for recent self harming behavior and chronic mental health issues. Spoke with nurse Sriram, who had no acute concerns at this time, but reports focus on acute pain in her LLE. Previous records indicate a history of schizoaffective disorder, Bipolar 1 disorder, borderline personality disorder, anxiety issues, and a recent positive drug screen for Cocaine.    Pt was noted alone in room RIB. She asked right away \"who are you? I dont need no mental health\". Pt was noted scrolling videos on her cellphone and barely made eye contact when asked questions. Pt stated \"anything you need to know you can ask my POA\" - Rasheed Gale, who she has been with since last October. She described living with her in the home in a supportive situation. Pt was was focused on severe pain in her LLE, which was noted as somewhat red with 2 straight laceration/scabs. She stated she had done those recently out of frustration with her pain to get some relief. When asked about previous attempts pt told different story about documentation from early January. I thought I was using mouth wash, but it was actually \"makeup \". Pt denied this was a suicide attempt. Pt confirmed her current documented meds and denied being on a long acting injectable. Pt denies current SI/HI and hallucinations. Pt denied need for resources and said I could \"probably come back in 3 or 4 days\". Pt intends to f/u with [facility] who have been seeing her.       Noted to nurse that patient had a recent drug screen positive for Cocaine. No drug screen appears to be ordered for this admission. A Psychiatrist consult is ordered. Pt will be seen 2/11. [facility] will sign off. Please contact if having questions or pt requests.   "
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
## 2025-02-10 NOTE — PLAN OF CARE
Goal Outcome Evaluation:  Plan of Care Reviewed With: patient           Outcome Evaluation: AOx4. Redness, swelling to LLE, elevated on pillows. Antibiotics given as ordered. PRN zofran given x 2 and percocet x 3 per pt request. Up with assist x 1, but pt very resistant to getting out of bed. Pt having some small loose stools. Elevated temperature this afternoon. Continue to monitor for needs.

## 2025-02-10 NOTE — PROGRESS NOTES
[facility]: Vancomycin Level Monitoring Note    Laya Castro is a 39 y.o. female who is on day 2 of pharmacy to dose vancomycin for Skin and Soft Tissue.    Estimated Creatinine Clearance: 112.1 mL/min (by C-G formula based on SCr of 0.87 mg/dL).    Current Vanc Dose:  1250 mg IV every  12  hours  Results from last 7 days   Lab Units 02/10/25  1435   VANCOMYCIN TR mcg/mL 14.40   Predicted AUC at current dose:487 mg/L.hr  Next Level Date and Time: No levels currently pending, will evaluate daily and order as appropriate    No changes at this time. Pharmacy is continuing to monitor and will adjust as needed.    Milton Wilcox, [facility]  Clinical Pharmacist
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
## 2025-02-10 NOTE — SIGNIFICANT NOTE
02/10/25 1601   OTHER   Discipline physical therapist   Rehab Time/Intention   Session Not Performed patient/family declined evaluation  (Pt reports she is having too much pain and can not get up at this time. PT will check back tomorrow.)   Recommendation   PT - Next Appointment 02/11/25

## 2025-02-11 LAB
ALBUMIN SERPL-MCNC: 2.8 G/DL (ref 3.5–5.2)
ALBUMIN/GLOB SERPL: 0.9 G/DL
ALP SERPL-CCNC: 77 U/L (ref 39–117)
ALT SERPL W P-5'-P-CCNC: 27 U/L (ref 1–33)
ANION GAP SERPL CALCULATED.3IONS-SCNC: 7 MMOL/L (ref 5–15)
AST SERPL-CCNC: 29 U/L (ref 1–32)
BILIRUB SERPL-MCNC: <0.2 MG/DL (ref 0–1.2)
BUN SERPL-MCNC: 5 MG/DL (ref 6–20)
BUN/CREAT SERPL: 7.4 (ref 7–25)
CALCIUM SPEC-SCNC: 8.1 MG/DL (ref 8.6–10.5)
CHLORIDE SERPL-SCNC: 112 MMOL/L (ref 98–107)
CO2 SERPL-SCNC: 20 MMOL/L (ref 22–29)
CREAT SERPL-MCNC: 0.68 MG/DL (ref 0.57–1)
DEPRECATED RDW RBC AUTO: 43.6 FL (ref 37–54)
EGFRCR SERPLBLD CKD-EPI 2021: 114.5 ML/MIN/1.73
ERYTHROCYTE [DISTWIDTH] IN BLOOD BY AUTOMATED COUNT: 12.4 % (ref 12.3–15.4)
GLOBULIN UR ELPH-MCNC: 3 GM/DL
GLUCOSE SERPL-MCNC: 95 MG/DL (ref 65–99)
HCT VFR BLD AUTO: 29 % (ref 34–46.6)
HGB BLD-MCNC: 9.8 G/DL (ref 12–15.9)
HIV 1+2 AB+HIV1 P24 AG SERPL QL IA: NORMAL
MCH RBC QN AUTO: 32.8 PG (ref 26.6–33)
MCHC RBC AUTO-ENTMCNC: 33.8 G/DL (ref 31.5–35.7)
MCV RBC AUTO: 97 FL (ref 79–97)
PLATELET # BLD AUTO: 150 10*3/MM3 (ref 140–450)
PMV BLD AUTO: 11.1 FL (ref 6–12)
POTASSIUM SERPL-SCNC: 3.7 MMOL/L (ref 3.5–5.2)
PROT SERPL-MCNC: 5.8 G/DL (ref 6–8.5)
RBC # BLD AUTO: 2.99 10*6/MM3 (ref 3.77–5.28)
SODIUM SERPL-SCNC: 139 MMOL/L (ref 136–145)
WBC NRBC COR # BLD AUTO: 4.75 10*3/MM3 (ref 3.4–10.8)

## 2025-02-11 PROCEDURE — 99232 SBSQ HOSP IP/OBS MODERATE 35: CPT | Performed by: INTERNAL MEDICINE

## 2025-02-11 PROCEDURE — 97530 THERAPEUTIC ACTIVITIES: CPT

## 2025-02-11 PROCEDURE — 25010000002 ENOXAPARIN PER 10 MG: Performed by: HOSPITALIST

## 2025-02-11 PROCEDURE — G0378 HOSPITAL OBSERVATION PER HR: HCPCS

## 2025-02-11 PROCEDURE — 25010000002 CEFAZOLIN PER 500 MG: Performed by: INTERNAL MEDICINE

## 2025-02-11 PROCEDURE — 96361 HYDRATE IV INFUSION ADD-ON: CPT

## 2025-02-11 PROCEDURE — 97116 GAIT TRAINING THERAPY: CPT

## 2025-02-11 PROCEDURE — 25010000002 VANCOMYCIN HCL 1.25 G RECONSTITUTED SOLUTION 1 EACH VIAL: Performed by: HOSPITALIST

## 2025-02-11 PROCEDURE — 96372 THER/PROPH/DIAG INJ SC/IM: CPT

## 2025-02-11 PROCEDURE — 85027 COMPLETE CBC AUTOMATED: CPT | Performed by: NURSE PRACTITIONER

## 2025-02-11 PROCEDURE — G0432 EIA HIV-1/HIV-2 SCREEN: HCPCS | Performed by: INTERNAL MEDICINE

## 2025-02-11 PROCEDURE — 97162 PT EVAL MOD COMPLEX 30 MIN: CPT

## 2025-02-11 PROCEDURE — 96366 THER/PROPH/DIAG IV INF ADDON: CPT

## 2025-02-11 PROCEDURE — 63710000001 DIPHENHYDRAMINE PER 50 MG: Performed by: HOSPITALIST

## 2025-02-11 PROCEDURE — 80053 COMPREHEN METABOLIC PANEL: CPT | Performed by: NURSE PRACTITIONER

## 2025-02-11 PROCEDURE — 25810000003 SODIUM CHLORIDE 0.9 % SOLUTION 250 ML FLEX CONT: Performed by: HOSPITALIST

## 2025-02-11 PROCEDURE — 96367 TX/PROPH/DG ADDL SEQ IV INF: CPT

## 2025-02-11 RX ADMIN — ACETAMINOPHEN 1000 MG: 500 TABLET, FILM COATED ORAL at 20:49

## 2025-02-11 RX ADMIN — OLANZAPINE 10 MG: 5 TABLET, FILM COATED ORAL at 20:49

## 2025-02-11 RX ADMIN — DIPHENHYDRAMINE HYDROCHLORIDE 25 MG: 25 CAPSULE ORAL at 04:39

## 2025-02-11 RX ADMIN — PERPHENAZINE 2 MG: 2 TABLET, FILM COATED ORAL at 20:49

## 2025-02-11 RX ADMIN — CEFAZOLIN 2000 MG: 2 INJECTION, POWDER, FOR SOLUTION INTRAMUSCULAR; INTRAVENOUS at 20:48

## 2025-02-11 RX ADMIN — ENOXAPARIN SODIUM 40 MG: 100 INJECTION SUBCUTANEOUS at 11:54

## 2025-02-11 RX ADMIN — Medication 50 MG: at 09:09

## 2025-02-11 RX ADMIN — CETIRIZINE HYDROCHLORIDE 10 MG: 10 TABLET, FILM COATED ORAL at 09:09

## 2025-02-11 RX ADMIN — OXYCODONE HYDROCHLORIDE 10 MG: 5 TABLET ORAL at 20:48

## 2025-02-11 RX ADMIN — CEFAZOLIN 2000 MG: 2 INJECTION, POWDER, FOR SOLUTION INTRAMUSCULAR; INTRAVENOUS at 11:54

## 2025-02-11 RX ADMIN — OXYCODONE HYDROCHLORIDE 10 MG: 5 TABLET ORAL at 15:11

## 2025-02-11 RX ADMIN — BUSPIRONE HYDROCHLORIDE 10 MG: 10 TABLET ORAL at 15:12

## 2025-02-11 RX ADMIN — TRAZODONE HYDROCHLORIDE 300 MG: 100 TABLET ORAL at 20:49

## 2025-02-11 RX ADMIN — TIZANIDINE 2 MG: 4 TABLET ORAL at 20:49

## 2025-02-11 RX ADMIN — BUSPIRONE HYDROCHLORIDE 10 MG: 10 TABLET ORAL at 09:09

## 2025-02-11 RX ADMIN — TOPIRAMATE 200 MG: 100 TABLET, FILM COATED ORAL at 20:50

## 2025-02-11 RX ADMIN — ACETAMINOPHEN 1000 MG: 500 TABLET, FILM COATED ORAL at 09:09

## 2025-02-11 RX ADMIN — OXYCODONE HYDROCHLORIDE 10 MG: 5 TABLET ORAL at 09:09

## 2025-02-11 RX ADMIN — FLUTICASONE PROPIONATE 1 SPRAY: 50 SPRAY, METERED NASAL at 09:09

## 2025-02-11 RX ADMIN — BUSPIRONE HYDROCHLORIDE 10 MG: 10 TABLET ORAL at 20:50

## 2025-02-11 RX ADMIN — VANCOMYCIN HYDROCHLORIDE 1250 MG: 1.25 INJECTION, POWDER, LYOPHILIZED, FOR SOLUTION INTRAVENOUS at 04:38

## 2025-02-11 RX ADMIN — SODIUM CHLORIDE 50 ML/HR: 9 INJECTION, SOLUTION INTRAVENOUS at 01:29

## 2025-02-11 RX ADMIN — PERPHENAZINE 2 MG: 2 TABLET, FILM COATED ORAL at 09:09

## 2025-02-11 RX ADMIN — OXYCODONE HYDROCHLORIDE 10 MG: 5 TABLET ORAL at 04:53

## 2025-02-11 RX ADMIN — ATORVASTATIN CALCIUM 20 MG: 20 TABLET, FILM COATED ORAL at 20:49

## 2025-02-11 NOTE — PROGRESS NOTES
Name: Laya Castro ADMIT: 2025   : 1985  PCP: Ebonie Blackwell APRN    MRN: [id number] LOS: 0 days   AGE/SEX: 39 y.o. female  ROOM: [facility]     Subjective   Subjective   Still complaining of a lot of pain.     Objective   Objective   Vital Signs  Temp:  [97 °F (36.1 °C)-100 °F (37.8 °C)] 97 °F (36.1 °C)  Heart Rate:  [58-85] 61  Resp:  [16-18] 18  BP: (75-99)/(44-73) 84/57  SpO2:  [95 %-100 %] 98 %  on   ;   Device (Oxygen Therapy): room air  Body mass index is 38.09 kg/m².    Physical Exam  Vitals and nursing note reviewed.   Constitutional:       Appearance: She is obese. She is ill-appearing.   Cardiovascular:      Rate and Rhythm: Normal rate and regular rhythm.   Pulmonary:      Effort: Pulmonary effort is normal. No respiratory distress.      Breath sounds: Normal breath sounds.   Abdominal:      General: Bowel sounds are normal. There is no distension.      Palpations: Abdomen is soft.      Tenderness: There is no abdominal tenderness.   Musculoskeletal:         General: Swelling and tenderness present.   Skin:     General: Skin is warm and dry.      Findings: Erythema (redness extending from left calf into left foot, warmth noted. some abrasions noted/healing/scabbed) present.   Neurological:      Mental Status: She is alert.      Sensory: No sensory deficit.      Motor: Weakness present.      Coordination: Coordination normal.       Results Review:       I reviewed the patient's new clinical results.  Results from last 7 days   Lab Units 25  0639 02/10/25  0704 25  0625  0029   WBC 10*3/mm3 4.75 7.36 16.53* 19.63*   HEMOGLOBIN g/dL 9.8* 11.2* 12.5 15.1   PLATELETS 10*3/mm3 150 172 189 239     Results from last 7 days   Lab Units 25  0638 02/10/25  1920 02/10/25  0704 25  0628 25  0029   SODIUM mmol/L 139  --  136 131* 134*   POTASSIUM mmol/L 3.7 3.8 3.2* 2.9* 3.4*   CHLORIDE mmol/L 112*  --  105 97* 97*   CO2 mmol/L 20.0*  --  21.0* 22.0  "24.0   BUN mg/dL 5*  --  8 13 17   CREATININE mg/dL 0.68  --  0.87 0.92 1.04*   GLUCOSE mg/dL 95  --  96 145* 108*   EGFR mL/min/1.73 114.5  --  87.6 81.9 70.7   ALBUMIN g/dL 2.8*  --   --  3.5 4.4   BILIRUBIN mg/dL <0.2  --   --  0.4 0.7   ALK PHOS U/L 77  --   --  85 115   AST (SGOT) U/L 29  --   --  16 18   ALT (SGPT) U/L 27  --   --  15 17     Results from last 7 days   Lab Units 02/11/25  0638 02/10/25  0704 02/09/25  0628 02/09/25  0029   CALCIUM mg/dL 8.1* 8.4* 8.7 10.0   ALBUMIN g/dL 2.8*  --  3.5 4.4   MAGNESIUM mg/dL  --  2.1  --   --      Results from last 7 days   Lab Units 02/09/25  0029   LACTATE mmol/L 1.8     No results found for: \"HGBA1C\", \"POCGLU\"    acetaminophen, 1,000 mg, Oral, TID  atorvastatin, 20 mg, Oral, Nightly  busPIRone, 10 mg, Oral, TID  cefTRIAXone, 2,000 mg, Intravenous, Q24H  cetirizine, 10 mg, Oral, Daily  diphenhydrAMINE, 25 mg, Oral, Q6H  docusate sodium, 100 mg, Oral, BID  enoxaparin, 40 mg, Subcutaneous, Q24H  fluticasone, 1 spray, Nasal, Daily  metoprolol succinate XL, 25 mg, Oral, Daily  nicotine, 1 patch, Transdermal, Q24H  OLANZapine, 10 mg, Oral, Nightly  perphenazine, 2 mg, Oral, BID  sodium chloride, 500 mL, Intravenous, Once  sodium chloride, 10 mL, Intravenous, Q12H  tiZANidine, 2 mg, Oral, Nightly  topiramate, 200 mg, Oral, Nightly  traZODone, 300 mg, Oral, Nightly  vancomycin, 1,250 mg, Intravenous, Q12H  vitamin B-6, 50 mg, Oral, Daily      Pharmacy to dose vancomycin,     Diet: Cardiac; Healthy Heart (2-3 Na+); Fluid Consistency: Thin (IDDSI 0)       Assessment/Plan     Active Hospital Problems    Diagnosis  POA    **Cellulitis of left lower extremity [L03.116]  Yes    Polysubstance abuse [F19.10]  Yes    Sepsis with cutaneous manifestations [A41.9]  Unknown    Morbid obesity [E66.01]  Yes    Anxiety disorder [F41.9]  Yes    Developmental disorder [F89]  Yes    Chronic deep vein thrombosis (DVT) of left lower extremity [I82.502]  Yes    Bipolar I disorder [F31.9]  " Yes    Tobacco user [Z72.0]  Yes    Obesity (BMI 30-39.9) [E66.9]  Yes    Borderline personality disorder [F60.3]  Yes      Resolved Hospital Problems   No resolved problems to display.       Ms. Castro is a 39 y.o. bipolar 1 disorder, borderline personality disorder, polysubstance abuse, developmental disorder, prior DVT/PE, obesity and tobacco abuse that presented to the hospital with complaints of left lower extremity pain, redness and swelling.  She was recently in the emergency department about 1 month ago when she ingested a substance and was found to be acutely psychotic.  She was transferred to inpatient psychiatry care as she was having self injures behaviors and cutting herself as well.  She was stabilized and returned home with a caregiver.  She was apparently found to be positive for chlamydia as well and treated with a couple doses of doxycycline prior to admission.  She was found to have concerns for cellulitis on arrival prompting further admission and treatment.    When confronted she adamantly denies ever using cocaine.  She tested positive for oxycodone which she has been getting here.  Negative for benzodiazepines at this time.  Discussed with Dr. Mcclain.  Still with significant cellulitic changes to her leg.  Continue IV antibiotics for now and will defer timing of transitioning to oral antibiotic and subsequent discharge to ID.       Cellulitis of left lower extremity  Sepsis on admission  Chronic DVT of LLE  -X-ray left tib/fib negative for acute findings.  -Continues on vancomycin and Rocephin.  -Blood culture negative thus far  -Leukocytosis resolved.    -ID following  -Venous Doppler shows only chronic DVT  -BP soft, but stable    Bipolar 1 disorder  Borderline personality disorder  Anxiety and developmental disorder  Polysubstance abuse  -UDS on 1/28 was positive for benzodiazepines, cocaine and opiates.  UDS this admission with cocaine and opiates.  -Currently on pain medication here.   Would not plan to discharge with narcotics.  -Continue home BuSpar, Zyprexa, perphenazine, trazodone and Topamax from home medications.  -She did have recent self-injurious behaviors but currently denies any symptomology or SI.  -[facility] has seen her and psychiatry is consulted      Lovenox 40 mg SC daily for DVT prophylaxis.  Full code.  Discussed with patient and consulting provider.  Anticipate discharge home in 1-2 days.  Expected Discharge Date: 2/11/2025; Expected Discharge Time:       Benjamin Sharp MD  [facility]  02/11/25  08:07 EST

## 2025-02-11 NOTE — PLAN OF CARE
Goal Outcome Evaluation:  Plan of Care Reviewed With: patient           Outcome Evaluation: 40yo obese white female admitted 2/8/25 with L LE cellulitis, fever, chlamydia, cocaine (+). PMH includes polysubstance abuse, sepsis, asthma, pna, PE, PTSD, bipolar. PLOF: Pt lives at home with caregiver who assists her with dressing and bathing. She states that she amb without AD. Today, she is found resting in bed on her cell phone and agreeable to participate in therapy. She req SBA to sit EOB and had cursing episode when L foot was brushed with blankets. She stood from EOB with SBA. Pt amb 200' with r wx and CGA - fair pace and balance, flexed posture, slight L LE limp, fair endurance. She returned to bed independently very close to EOB. She was able to reposition herself with vc. She is adamant that [facility] is returning home upon d/c but would like to have a r wx if possible. Pt is safe to amb with [facility] staff and safe to return home from a mobility standpoint. Will sign off.    Anticipated Discharge Disposition (PT): home with assist, other (see comments) (with caregiver)

## 2025-02-11 NOTE — PROGRESS NOTES
LOS: 0 days     Chief Complaint: Left lower extremity cellulitis    Interval History: Afebrile, continues to report left leg pain.  Does not think she is getting better.  Tolerating antibiotics vomiting diarrhea or rash    Vital Signs  Temp:  [97 °F (36.1 °C)-100 °F (37.8 °C)] 97 °F (36.1 °C)  Heart Rate:  [58-85] 61  Resp:  [16-18] 18  BP: (75-99)/(44-73) 84/57    Physical Exam:  General: In no acute distress  Cardiovascular: RRR  Respiratory: Breathing comfortably on room air  GI: Soft, NT/ND,   Skin: No rashes   Extremities: Left lower extremity with decreased erythema but persistent swelling some blistering    Antibiotics:  Vancomycin dosing per pharmacy  Ceftriaxone 2 g IV every 24 hours     Results Review:      Lab Results   Component Value Date    WBC 4.75 02/11/2025    HGB 9.8 (L) 02/11/2025    HCT 29.0 (L) 02/11/2025    MCV 97.0 02/11/2025     02/11/2025     Lab Results   Component Value Date    GLUCOSE 95 02/11/2025    BUN 5 (L) 02/11/2025    CREATININE 0.68 02/11/2025    EGFRIFAFRI >60 02/19/2023    BCR 7.4 02/11/2025    CO2 20.0 (L) 02/11/2025    CALCIUM 8.1 (L) 02/11/2025    ALBUMIN 2.8 (L) 02/11/2025    LABIL2 1.1 02/19/2023    AST 29 02/11/2025    ALT 27 02/11/2025       Microbiology:  2/9 BCx NGTD x 2     Assessment & Plan   Fever  LLE cellulitis in the setting of a chronic DVT  Polysubstance abuse complicating above  Leukocytosis resolved     Fevers and leukocytosis have resolved.  Erythema has improved.  Discontinue empiric vancomycin and ceftriaxone and start cefazolin 2 g IV every 8 hours.  As long as the left lower extremity continues to improve anticipate discharge on oral antibiotics in the next day or 2

## 2025-02-11 NOTE — PROGRESS NOTES
Discharge Planning Assessment  [facility]     Patient Name: Laya Castro  MRN: [id number]  Today's Date: 2/11/2025    Admit Date: 2/8/2025    Plan: home   Discharge Needs Assessment    No documentation.                  Discharge Plan       Row Name 02/11/25 1339       Plan    Plan home    Plan Comments The patient transferred to [facility] from ER on 2/9/25 @ 04:35. Per Infect. Disease MD patient may go home on PO medications and will not need IVAB's at discharge. Tried to screen the patient and she screamed at me to get out and to close the door. Was not able to screen. CCP will follow if patient has discharge needs. JAYNE Lopez RN, CCP.                  Continued Care and Services - Admitted Since 2/8/2025    No active coordination exists for this encounter.       Expected Discharge Date and Time       Expected Discharge Date Expected Discharge Time    Feb 13, 2025            Demographic Summary       Row Name 02/11/25 1339       General Information    Admission Type observation    Arrived From home    Referral Source admission list    Reason for Consult discharge planning    Preferred Language English                   Functional Status    No documentation.                  Psychosocial    No documentation.                  Abuse/Neglect    No documentation.                  Legal    No documentation.                  Substance Abuse    No documentation.                  Patient Forms    No documentation.                     Chanda Lopez, RN

## 2025-02-11 NOTE — PLAN OF CARE
Goal Outcome Evaluation:  Plan of Care Reviewed With: patient        Progress: improving  Outcome Evaluation: A&O x4. Redness and edema to LLE improving compared to last night. Encouraged to elevation on several pillows. IV Vanc and Rocephin. Asst standby to BSC. This is improved since last night as well. 500mL NS bolus for hypotension. Happened around the same time last night. Voiding. Pericolace held for loose stool.

## 2025-02-11 NOTE — CONSULTS
IDENTIFYING INFORMATION: The patient is a 39-year-old white female admitted for treatment of cellulitis.  She has a history of borderline personality disorder, borderline intellectual functioning and was recently hospitalized at our [facility].    CHIEF COMPLAINT: I do not need to talk to you    INFORMANT: Patient and chart next field limited    RELIABILITY: Limited, at best    HISTORY OF PRESENT ILLNESS: The patient is a 39-year-old white female admitted for treatment of cellulitis of the lower extremity.  She was recently admitted to [facility] per her report.  Current medications include BuSpar, Zyprexa, perphenazine, trazodone and Topamax.  The patient tested positive for cocaine but denies abuse of this substance.  When seen today the patient reports that she does not wish to be interviewed by this patient in order him to leave the room.  She reportedly has a history of self-harming behavior.    PAST PSYCHIATRIC HISTORY: As above next field     PMHx:significant for morbid obesity, lower extremity cellulitis, sepsis, deep venous thrombosis      MEDICATIONS:   Current Facility-Administered Medications   Medication Dose Route Frequency Provider Last Rate Last Admin    acetaminophen (TYLENOL) tablet 1,000 mg  1,000 mg Oral TID Bel Funk APRN   1,000 mg at 02/11/25 0909    atorvastatin (LIPITOR) tablet 20 mg  20 mg Oral Nightly Claudio Luna MD   20 mg at 02/10/25 2131    sennosides-docusate (PERICOLACE) 8.6-50 MG per tablet 2 tablet  2 tablet Oral BID PRN Rubi Schaefer APRN        And    polyethylene glycol (MIRALAX) packet 17 g  17 g Oral Daily PRN Rubi Schaefer APRN        And    bisacodyl (DULCOLAX) EC tablet 5 mg  5 mg Oral Daily PRN Rubi Schaefer APRN        And    bisacodyl (DULCOLAX) suppository 10 mg  10 mg Rectal Daily PRN Rubi Schaefer APRN        busPIRone (BUSPAR) tablet 10 mg  10 mg Oral TID Claudio Luna MD   10 mg at 02/11/25 0909     ceFAZolin 2000 mg IVPB in 100 mL NS (MBP)  2,000 mg Intravenous Q8H Brook Mcclain MD   2,000 mg at 02/11/25 1154    cetirizine (zyrTEC) tablet 10 mg  10 mg Oral Daily Claudio Luna MD   10 mg at 02/11/25 0909    diphenhydrAMINE (BENADRYL) capsule 25 mg  25 mg Oral Q6H Claudio Luna MD   25 mg at 02/11/25 0439    Enoxaparin Sodium (LOVENOX) syringe 40 mg  40 mg Subcutaneous Q24H Claudio Luna MD   40 mg at 02/11/25 1154    famotidine (PEPCID) tablet 20 mg  20 mg Oral BID PRN Rubi Schaefer APRN        fluticasone (FLONASE) 50 MCG/ACT nasal spray 1 spray  1 spray Nasal Daily Claudio Luna MD   1 spray at 02/11/25 0909    melatonin tablet 5 mg  5 mg Oral Nightly PRN Rubi Schaefer APRN        metoprolol succinate XL (TOPROL-XL) 24 hr tablet 25 mg  25 mg Oral Daily Claudio Luna MD        nicotine (NICODERM CQ) 21 MG/24HR patch 1 patch  1 patch Transdermal Q24H Bel Funk APRN        OLANZapine (zyPREXA) tablet 10 mg  10 mg Oral Nightly Claudio Luna MD   10 mg at 02/10/25 2131    ondansetron (ZOFRAN) injection 4 mg  4 mg Intravenous Q6H PRN Rubi Schaefer APRN   4 mg at 02/09/25 1931    oxyCODONE (ROXICODONE) immediate release tablet 10 mg  10 mg Oral Q4H PRN Bel Funk APRN   10 mg at 02/11/25 0909    perphenazine (TRILAFON) tablet 2 mg  2 mg Oral BID Claudio Luna MD   2 mg at 02/11/25 0909    sodium chloride 0.9 % bolus 500 mL  500 mL Intravenous Once Dai Deutsch APRN   Stopped at 02/11/25 0400    sodium chloride 0.9 % flush 10 mL  10 mL Intravenous Q12H Rubi Schaefer APRN   10 mL at 02/10/25 0853    sodium chloride 0.9 % flush 10 mL  10 mL Intravenous PRN Rubi Schaefer APRN        sodium chloride 0.9 % infusion 40 mL  40 mL Intravenous PRN Rubi Schaefer APRN        tiZANidine (ZANAFLEX) tablet 2 mg  2 mg Oral Nightly Claudio Luna MD   2 mg at 02/10/25 2131    topiramate (TOPAMAX) tablet 200 mg  200  mg Oral Nightly Claudio Luna MD   200 mg at 02/10/25 2130    traZODone (DESYREL) tablet 300 mg  300 mg Oral Nightly Claudio Luna MD   300 mg at 02/10/25 2130    vitamin B-6 (PYRIDOXINE) tablet 50 mg  50 mg Oral Daily Claudio Luna MD   50 mg at 02/11/25 0909         ALLERGIES: Haldol, promethazine, diphenhydramine    FAMILY HISTORY: Not obtained next field next field next field    SOCIAL HISTORY: The patient declines to answer questions regarding social history    MENTAL STATUS EXAM: The patient is an obese white female appearing her stated age.  She has no apparent physical distress at the time examination.  She denies any suicidal or homicidal ideation then orders disposition to terminate interview.    ASSETS/LIABILITIES: To be assessed    DIAGNOSTIC IMPRESSION: Adjustment disorder with mixed emotional features, borderline personality disorder, possible cocaine use disorder, borderline intellectual functioning, medical problems as noted previously next field    PLAN: The patient has had no behavioral problems while hospitalized and is probably at or near her meager psychiatric baseline.  As she is refusing to cooperate with me, I will sign off.
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
## 2025-02-12 VITALS
BODY MASS INDEX: 38.17 KG/M2 | RESPIRATION RATE: 18 BRPM | WEIGHT: 243.17 LBS | OXYGEN SATURATION: 98 % | SYSTOLIC BLOOD PRESSURE: 94 MMHG | HEIGHT: 67 IN | HEART RATE: 68 BPM | TEMPERATURE: 97.5 F | DIASTOLIC BLOOD PRESSURE: 57 MMHG

## 2025-02-12 PROCEDURE — 99233 SBSQ HOSP IP/OBS HIGH 50: CPT | Performed by: STUDENT IN AN ORGANIZED HEALTH CARE EDUCATION/TRAINING PROGRAM

## 2025-02-12 PROCEDURE — 96366 THER/PROPH/DIAG IV INF ADDON: CPT

## 2025-02-12 PROCEDURE — G0378 HOSPITAL OBSERVATION PER HR: HCPCS

## 2025-02-12 PROCEDURE — 25010000002 CEFAZOLIN PER 500 MG: Performed by: INTERNAL MEDICINE

## 2025-02-12 RX ORDER — CEPHALEXIN 500 MG/1
500 CAPSULE ORAL EVERY 6 HOURS SCHEDULED
Qty: 20 CAPSULE | Refills: 0 | Status: SHIPPED | OUTPATIENT
Start: 2025-02-12 | End: 2025-02-17

## 2025-02-12 RX ORDER — CEPHALEXIN 500 MG/1
500 CAPSULE ORAL EVERY 6 HOURS SCHEDULED
Status: DISCONTINUED | OUTPATIENT
Start: 2025-02-12 | End: 2025-02-12 | Stop reason: HOSPADM

## 2025-02-12 RX ADMIN — OXYCODONE HYDROCHLORIDE 10 MG: 5 TABLET ORAL at 10:28

## 2025-02-12 RX ADMIN — CEFAZOLIN 2000 MG: 2 INJECTION, POWDER, FOR SOLUTION INTRAMUSCULAR; INTRAVENOUS at 02:34

## 2025-02-12 RX ADMIN — OXYCODONE HYDROCHLORIDE 10 MG: 5 TABLET ORAL at 05:06

## 2025-02-12 RX ADMIN — CEPHALEXIN 500 MG: 500 CAPSULE ORAL at 12:37

## 2025-02-12 NOTE — DISCHARGE PLACEMENT REQUEST
"Laya El (39 y.o. Female)       Date of Birth   1985    Social Security Number       Address   [address] [place] [address]    Home Phone       MRN   [id number]       Shinto   None    Marital Status   Single                            Admission Date   2/8/25    Admission Type   Emergency    Admitting Provider   Claudio Luna MD    Attending Provider   Benjamin Sharp MD    Department, Room/Bed   [address], [facility]9/1       Discharge Date       Discharge Disposition   Home or Self Care    Discharge Destination                                 Attending Provider: Benjamin Sharp MD    Allergies: Haloperidol, Promethazine, Adhesive Tape, Diphenhydramine-acetaminophen    Isolation: None   Infection: None   Code Status: CPR    Ht: 170.2 cm (67\")   Wt: 110 kg (243 lb 2.7 oz)    Admission Cmt: None   Principal Problem: Cellulitis of left lower extremity [L03.116]                   Active Insurance as of 2/8/2025       Primary Coverage       Payor Plan Insurance Group Employer/Plan Group    [place] [id number]       Payor Plan Address Payor Plan Phone Number Payor Plan Fax Number Effective Dates    [employer] [address] [address] [phone]  4/1/2020 - None Entered    [facility] [id number]         Subscriber Name Subscriber Birth Date Member ID       LAYA EL 1985 [id number]                     Emergency Contacts        (Rel.) Home Phone Work Phone Mobile Phone    JESSICA MARTINEZ (Caregiver) (Other) [phone] -- --    DungSheila (Mother) [phone] -- --                "
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
## 2025-02-12 NOTE — PLAN OF CARE
Goal Outcome Evaluation:  Plan of Care Reviewed With: patient        Progress: improving  Outcome Evaluation: A&O x4. Irritable about cigarettes being taken yest. Eating and drinking well. Voiding. IV Cefazolin q8 hr. Redness and edema in LLE appear to be decreasing. Encouraged to keep elevated. BP cont to be lower several hours after night meds. Called A at 0014 but did not rec a call back. All other vitals normal.

## 2025-02-12 NOTE — DISCHARGE SUMMARY
Patient Name: Laya Castro  : 1985  MRN: [id number]    Date of Admission: 2025  Date of Discharge:  2025  Primary Care Physician: Ebonie Blackwell APRN      Chief Complaint:   Leg Swelling      Discharge Diagnoses     Active Hospital Problems    Diagnosis  POA    **Cellulitis of left lower extremity [L03.116]  Yes    Polysubstance abuse [F19.10]  Yes    Sepsis with cutaneous manifestations [A41.9]  Unknown    Morbid obesity [E66.01]  Yes    Anxiety disorder [F41.9]  Yes    Developmental disorder [F89]  Yes    Chronic deep vein thrombosis (DVT) of left lower extremity [I82.502]  Yes    Bipolar I disorder [F31.9]  Yes    Tobacco user [Z72.0]  Yes    Obesity (BMI 30-39.9) [E66.9]  Yes    Borderline personality disorder [F60.3]  Yes      Resolved Hospital Problems   No resolved problems to display.        Hospital Course     Ms. Castro is a 39 y.o. female with a history of psychiatric illness who presented to [facility] initially complaining of painful red swelling in her left leg.  Please see the admitting history and physical for further details.  She was found to have left lower extremity cellulitis and was admitted to the hospital for further evaluation and treatment.  Doppler was negative for acute DVT did demonstrate chronic DVT.  Infectious disease was consulted.  She was treated with empiric antibiotics and cultures have been negative.  She eventually was transferred over to cefazolin and cleared for discharge home on Keflex.      Day of Discharge     Subjective:  Feels a little bit better.  No new complaints.    Physical Exam:  Temp:  [97.1 °F (36.2 °C)-99.3 °F (37.4 °C)] 97.5 °F (36.4 °C)  Heart Rate:  [59-73] 68  Resp:  [16-18] 18  BP: (71-98)/(41-67) 94/57  Body mass index is 38.09 kg/m².  Physical Exam  Left leg erythema significantly improved.        Consultants     Consult Orders (all) (From admission, onward)       Start     Ordered    02/10/25 2833   Inpatient Psychiatrist Consult  Once        Specialty:  Psychiatry  Provider:  Casimiro Rees III, MD    02/10/25 1216    02/10/25 1013  Inpatient Infectious Diseases Consult  Once        Specialty:  Infectious Diseases  Provider:  Jeancarlos Daugherty MD    02/10/25 1013    02/10/25 1006  Inpatient Access Center Consult  Once        Provider:  (Not yet assigned)    02/10/25 1006            Procedures     Imaging Results (All)       Procedure Component Value Units Date/Time    XR Tibia Fibula 2 View Left [[id number]] Collected: 02/09/25 0112     Updated: 02/09/25 0116    Narrative:      XR TIBIA FIBULA 2 VW LEFT-       HISTORY:   leg swelling, redness      TECHNIQUE: AP and lateral views of the left tibia and fibula.     FINDINGS:     Negative for acute fracture, dislocation, or radiopaque foreign body.       Impression:         No acute abnormality.        This report was finalized on 2/9/2025 1:12 AM by Dr. Cyrus Narayan M.D  on Workstation: [id number]             Results for orders placed during the hospital encounter of 02/08/25    Duplex Venous Lower Extremity - Right CAR    Interpretation Summary    Normal right lower extremity venous duplex scan.      Pertinent Labs     Results from last 7 days   Lab Units 02/11/25  0639 02/10/25  0704 02/09/25  0628 02/09/25  0029   WBC 10*3/mm3 4.75 7.36 16.53* 19.63*   HEMOGLOBIN g/dL 9.8* 11.2* 12.5 15.1   PLATELETS 10*3/mm3 150 172 189 239     Results from last 7 days   Lab Units 02/11/25  0638 02/10/25  1920 02/10/25  0704 02/09/25  0628 02/09/25  0029   SODIUM mmol/L 139  --  136 131* 134*   POTASSIUM mmol/L 3.7 3.8 3.2* 2.9* 3.4*   CHLORIDE mmol/L 112*  --  105 97* 97*   CO2 mmol/L 20.0*  --  21.0* 22.0 24.0   BUN mg/dL 5*  --  8 13 17   CREATININE mg/dL 0.68  --  0.87 0.92 1.04*   GLUCOSE mg/dL 95  --  96 145* 108*   EGFR mL/min/1.73 114.5  --  87.6 81.9 70.7     Results from last 7 days   Lab Units 02/11/25  0638 02/09/25  0628 02/09/25  0029  "  ALBUMIN g/dL 2.8* 3.5 4.4   BILIRUBIN mg/dL <0.2 0.4 0.7   ALK PHOS U/L 77 85 115   AST (SGOT) U/L 29 16 18   ALT (SGPT) U/L 27 15 17     Results from last 7 days   Lab Units 02/11/25  0638 02/10/25  0704 02/09/25  0628 02/09/25  0029   CALCIUM mg/dL 8.1* 8.4* 8.7 10.0   ALBUMIN g/dL 2.8*  --  3.5 4.4   MAGNESIUM mg/dL  --  2.1  --   --        Results from last 7 days   Lab Units 02/09/25  0628   D DIMER QUANT MCGFEU/mL 0.73*           Invalid input(s): \"LDLCALC\"  Results from last 7 days   Lab Units 02/09/25  0225 02/09/25  0203   BLOODCX  No growth at 3 days No growth at 3 days         Test Results Pending at Discharge     Pending Results       None              Discharge Details        Discharge Medications        Changes to Medications        Instructions Start Date   cephalexin 500 MG capsule  Commonly known as: KEFLEX  What changed: when to take this   500 mg, Oral, Every 6 Hours Scheduled             Continue These Medications        Instructions Start Date   acetaminophen 500 MG tablet  Commonly known as: TYLENOL   1,000 mg, Oral, Every 6 Hours PRN      albuterol sulfate  (90 Base) MCG/ACT inhaler  Commonly known as: PROVENTIL HFA;VENTOLIN HFA;PROAIR HFA   2 puffs, Every 4 Hours PRN      atorvastatin 20 MG tablet  Commonly known as: LIPITOR   20 mg, Nightly      busPIRone 10 MG tablet  Commonly known as: BUSPAR   10 mg, 3 Times Daily      cyclobenzaprine 10 MG tablet  Commonly known as: FLEXERIL   10 mg, 3 Times Daily      EPINEPHrine 0.3 MG/0.3ML solution auto-injector injection  Commonly known as: EPIPEN   0.3 mg      naloxone 4 MG/0.1ML nasal spray  Commonly known as: NARCAN   Call 911. Don't prime. Morton in 1 nostril for overdose. Repeat in 2-3 minutes in other nostril if no or minimal breathing/responsiveness.      perphenazine 2 MG tablet  Commonly known as: TRILAFON   1 tablet, Oral, 2 Times Daily      Topamax 200 MG tablet  Generic drug: topiramate   1 tablet, Nightly      traZODone 100 MG " tablet  Commonly known as: DESYREL   300 mg, Nightly      vitamin B-6 50 MG tablet  Commonly known as: PYRIDOXINE   50 mg, Daily      ZyPREXA 10 MG tablet  Generic drug: OLANZapine   1 tablet, Nightly             Stop These Medications      diphenhydrAMINE 25 mg capsule  Commonly known as: BENADRYL     docusate sodium 100 MG capsule  Commonly known as: COLACE     fluticasone 50 MCG/ACT nasal spray  Commonly known as: FLONASE     HYDROmorphone 2 MG tablet  Commonly known as: Dilaudid     loratadine 10 MG disintegrating tablet  Commonly known as: CLARITIN REDITABS     meloxicam 15 MG tablet  Commonly known as: Mobic     metoprolol succinate XL 25 MG 24 hr tablet  Commonly known as: TOPROL-XL     ondansetron 4 MG tablet  Commonly known as: Zofran     oxyCODONE-acetaminophen  MG per tablet  Commonly known as: Percocet     tiZANidine 2 MG tablet  Commonly known as: ZANAFLEX              Allergies   Allergen Reactions    Haloperidol Anaphylaxis and Swelling           Promethazine Anaphylaxis, Hives and Swelling     Hives and throat swelling   Throat swelling  Hives and throat swelling   Throat swelling      Adhesive Tape Rash     SKIN COMES OFF    Diphenhydramine-Acetaminophen Unknown - Low Severity       Discharge Disposition:  Home or Self Care      Discharge Diet:  Diet Order   Procedures    Diet: Cardiac; Healthy Heart (2-3 Na+); Fluid Consistency: Thin (IDDSI 0)       Discharge Activity:   Activity Instructions       Activity as Tolerated              CODE STATUS:    Code Status and Medical Interventions: CPR (Attempt to Resuscitate); Full Support   Ordered at: 02/09/25 0413     Code Status (Patient has no pulse and is not breathing):    CPR (Attempt to Resuscitate)     Medical Interventions (Patient has pulse or is breathing):    Full Support       Future Appointments   Date Time Provider Department Center   3/19/2025  8:10 AM Carlos Giang MD [facility]     Additional Instructions for the Follow-ups  that You Need to Schedule       Discharge Follow-up with PCP   As directed       Currently Documented PCP:    Ebonie Blackwell APRN    PCP Phone Number:    [phone]     Follow Up Details: 1 to 2 weeks (or sooner if problems)               Contact information for follow-up providers       Ebonie Blackwell APRN .    Specialty: Family Medicine  Why: 1 to 2 weeks (or sooner if problems)  Contact information:  [address]  [address]  [place] [address]  [phone]                       Contact information for after-discharge care       Durable Medical Equipment       [facility] .    Service: Durable Medical Equipment  Contact information:  [address]  [place] [address]  [phone]                                   Additional Instructions for the Follow-ups that You Need to Schedule       Discharge Follow-up with PCP   As directed       Currently Documented PCP:    Ebonie Blackwell APRN    PCP Phone Number:    [phone]     Follow Up Details: 1 to 2 weeks (or sooner if problems)            Time Spent on Discharge:  Greater than 30 minutes      Benjamin Sharp MD  [facility]  02/12/25  10:22 EST

## 2025-02-12 NOTE — PROGRESS NOTES
Case Management Discharge Note      Final Note: Discharged to home and needed cab voucher [id number] and account ID [id number] z-tip. [employer] delivered the rolling walker to the patient's room.  JAYNE Lopez Rn, CCP         Selected Continued Care - Admitted Since 2/8/2025       Destination    No services have been selected for the patient.       Faxed to HIM the taxi voucher. JAYNE Lopez RN, CCP.   cab voucher [id number] and account ID [id number] z-tip. JAYNE Lopez Rn, CCP          Durable Medical Equipment       Service Provider Services Address Phone Fax Patient Preferred    [facility] [address] [address] [phone] --              Dialysis/Infusion    No services have been selected for the patient.                Home Medical Care    No services have been selected for the patient.                Therapy    No services have been selected for the patient.                Community Resources    No services have been selected for the patient.                Community & DME    No services have been selected for the patient.                    Transportation Services  Taxi: [employer] (cab voucher [id number] and account ID [id number] z-tip. JAYNE Lopez Rn, CCP)    Final Discharge Disposition Code: 01 - home or self-care

## 2025-02-12 NOTE — PROGRESS NOTES
Problem: Communication  Goal: The ability to communicate needs accurately and effectively will improve  Outcome: PROGRESSING AS EXPECTED     Problem: Safety  Goal: Will remain free from injury  Outcome: PROGRESSING AS EXPECTED     Problem: Safety  Goal: Will remain free from falls  Outcome: PROGRESSING AS EXPECTED     Problem: Infection  Goal: Will remain free from infection  Outcome: PROGRESSING AS EXPECTED      Discharge Planning Assessment  [facility]     Patient Name: Laya Castro  MRN: [id number]  Today's Date: 2/12/2025    Admit Date: 2/8/2025    Plan: home   Discharge Needs Assessment    No documentation.                  Discharge Plan       Row Name 02/12/25 1233       Plan    Plan Comments The patient would like a rolling walker for home. Called [facility] and she will notify Dr. Sharp for the order. The patient did not have a preference on which DME company. The patient states she will need a cab at discharge. JAYNE Lopez RN, CCP.                  Continued Care and Services - Admitted Since 2/8/2025       Durable Medical Equipment       Service Provider Request Status Services Address Phone Fax Patient Preferred    [facility] Pending - Request Sent -- [address] [place] [address] [id number] [address] [phone] --                  Expected Discharge Date and Time       Expected Discharge Date Expected Discharge Time    Feb 12, 2025            Demographic Summary    No documentation.                  Functional Status    No documentation.                  Psychosocial    No documentation.                  Abuse/Neglect    No documentation.                  Legal    No documentation.                  Substance Abuse    No documentation.                  Patient Forms    No documentation.                     Chanda oLpez RN

## 2025-02-12 NOTE — PROGRESS NOTES
Discharge Planning Assessment  [facility]     Patient Name: Laya Castro  MRN: [id number]  Today's Date: 2/12/2025    Admit Date: 2/8/2025    Plan: home   Discharge Needs Assessment    No documentation.                  Discharge Plan       Row Name 02/12/25 1404       Plan    Plan Comments [employer] delivered the rolling walker. cab voucher [phone] and account ID [id number] z-tip. JAYNE Lopez Rn, CCP    Final Discharge Disposition Code 01 - home or self-care    Final Note Discharged to home and needed cab voucher [phone] and account ID [id number] z-tip. [employer] delivered the rolling walker to the patient's room.  JAYNE Lopez Rn, CCP      Row Name 02/12/25 1233       Plan    Plan Comments The patient would like a rolling walker for home. Called [facility] and she will notify Dr. Sharp for the order. The patient did not have a preference on which DME company. The patient states she will need a cab at discharge. JAYNE Lopez RN, CCP.                  Continued Care and Services - Admitted Since 2/8/2025       Durable Medical Equipment       Service Provider Request Status Services Address Phone Fax Patient Preferred    [facility]  Selected Durable Medical Equipment [address] [id number] [phone] --                  Expected Discharge Date and Time       Expected Discharge Date Expected Discharge Time    Feb 12, 2025            Demographic Summary    No documentation.                  Functional Status    No documentation.                  Psychosocial    No documentation.                  Abuse/Neglect    No documentation.                  Legal    No documentation.                  Substance Abuse    No documentation.                  Patient Forms    No documentation.                     Chanda Lopez RN

## 2025-02-12 NOTE — PROGRESS NOTES
LOS: 0 days     Chief Complaint: Cellulitis    Interval History: Patient resting comfortably this morning.  States she does not think there has been much change in the lower extremity.  Tolerating antibiotics.    Vital Signs  Temp:  [97.1 °F (36.2 °C)-99.3 °F (37.4 °C)] 99.1 °F (37.3 °C)  Heart Rate:  [59-73] 60  Resp:  [16-18] 18  BP: ()/(41-67) 92/67    Physical Exam:  General: In no acute distress  HEENT: Oropharynx clear, moist mucous membranes  Respiratory: Normal work of breathing  Extremities: Edema and erythema of the left lower extremity.  Access: Peripheral IV.    Antibiotics:  Anti-Infectives (From admission, onward)      Ordered     Dose/Rate Route Frequency Start Stop    02/11/25 0922  ceFAZolin 2000 mg IVPB in 100 mL NS (MBP)        Ordering Provider: Brook Mcclain MD    2,000 mg  over 30 Minutes Intravenous Every 8 Hours 02/11/25 1100 02/18/25 1059    02/09/25 0413  cefTRIAXone (ROCEPHIN) 2,000 mg in sodium chloride 0.9 % 100 mL MBP  Status:  Discontinued        Ordering Provider: Rubi Schaefer APRN    2,000 mg  200 mL/hr over 30 Minutes Intravenous Every 24 Hours 02/09/25 1800 02/11/25 0922    02/09/25 1100  Vancomycin HCl 1,250 mg in sodium chloride 0.9 % 250 mL VTB  Status:  Discontinued        Ordering Provider: Claudio Luna MD    1,250 mg  200 mL/hr over 75 Minutes Intravenous Every 12 Hours 02/09/25 1500 02/11/25 0922    02/09/25 0925  azithromycin (ZITHROMAX) tablet 1,000 mg        Ordering Provider: Claudio Luna MD    1,000 mg Oral Once 02/09/25 1015 02/09/25 1119    02/09/25 0930  Pharmacy to dose vancomycin  Status:  Discontinued        Ordering Provider: Claudio Luna MD     Not Applicable Continuous PRN 02/09/25 0930 02/11/25 0927    02/09/25 0119  cefTRIAXone (ROCEPHIN) 2,000 mg in sodium chloride 0.9 % 100 mL MBP        Ordering Provider: Narda Escobedo MD    2,000 mg  200 mL/hr over 30 Minutes Intravenous Once 02/09/25 0135 02/09/25 0317     02/09/25 0119  vancomycin 2250 mg/500 mL 0.9% NS IVPB ([facility])        Ordering Provider: Narda Escobedo MD    20 mg/kg × 118 kg  over 135 Minutes Intravenous Once 02/09/25 0135 02/09/25 0532             Results Review:     I reviewed the patient's new clinical results.    Lab Results   Component Value Date    WBC 4.75 02/11/2025    HGB 9.8 (L) 02/11/2025    HCT 29.0 (L) 02/11/2025    MCV 97.0 02/11/2025     02/11/2025     Lab Results   Component Value Date    GLUCOSE 95 02/11/2025    BUN 5 (L) 02/11/2025    CREATININE 0.68 02/11/2025    EGFRIFAFRI >60 02/19/2023    BCR 7.4 02/11/2025    CO2 20.0 (L) 02/11/2025    CALCIUM 8.1 (L) 02/11/2025    ALBUMIN 2.8 (L) 02/11/2025    LABIL2 1.1 02/19/2023    AST 29 02/11/2025    ALT 27 02/11/2025       Lab Results   Component Value Date    VANCOTROUGH 14.40 02/10/2025        Isolation:   No active isolations    Microbiology:  Microbiology Results (last 10 days)       Procedure Component Value - Date/Time    Blood Culture - Blood, Arm, Left [[id number]]  (Normal) Collected: 02/09/25 0225    Lab Status: Preliminary result Specimen: Blood from Arm, Left Updated: 02/12/25 0245     Blood Culture No growth at 3 days    Narrative:      Less than seven (7) mL's of blood was collected.  Insufficient quantity may yield false negative results.    Blood Culture - Blood, Arm, Left [[id number]]  (Normal) Collected: 02/09/25 0203    Lab Status: Preliminary result Specimen: Blood from Arm, Left Updated: 02/12/25 0215     Blood Culture No growth at 3 days             Assessment    #Fever, resolved  #LLE cellulitis in the setting of a chronic DVT  #Polysubstance abuse complicating above  #Leukocytosis, resolved   (All new to me today)     Continue cefazolin 2 g every 8 hours for cellulitis.  First time seeing the lower extremity today but erythema and edema improving per nursing notes.  Likely plan for discharge on oral Keflex 500 mg 4 times daily when ready for  discharge.    Addendum:  Case discussed with Dr. Sharp and continued gradual improvement of the lower extremity.  Plan to stop IV therapy today and transition to oral Keflex 500 mg 4 times daily for further 5-day course for skin and soft tissue infection.  Okay for discharge from an ID perspective.

## 2025-02-12 NOTE — PLAN OF CARE
Goal Outcome Evaluation:           Progress: improving  Outcome Evaluation: Pt discharged home in cab with oral antibiotics and walker for home use. Pt verbalized understanding of discharge teaching

## 2025-02-14 LAB
BACTERIA SPEC AEROBE CULT: NORMAL
BACTERIA SPEC AEROBE CULT: NORMAL

## 2025-03-19 ENCOUNTER — TELEPHONE (OUTPATIENT)
Dept: ORTHOPEDIC SURGERY | Facility: CLINIC | Age: 40
End: 2025-03-19

## 2025-05-13 ENCOUNTER — TELEPHONE (OUTPATIENT)
Dept: ORTHOPEDIC SURGERY | Facility: CLINIC | Age: 40
End: 2025-05-13

## 2025-05-13 NOTE — TELEPHONE ENCOUNTER
Hub staff attempted to follow warm transfer process and was unsuccessful     Caller: Laya Castro    Relationship to patient: Self    Best call back number: [phone]    Patient is needing: PT CALLING TO REQUEST ASAP APPT FOR SHOULDER SX FOLLOW UP. PT HAD SX IN 2024 WITH DR POOLE. PT AWARE DR POOLE NOT IN OFFICE TODAY. PLEASE CONTACT PT TO DISCUSS SOONEST POSSIBLE APPT WITH DR POOLE OR CONTRERAS LUEVANO.

## (undated) DEVICE — PENCL ES MEGADINE EZ/CLEAN BUTN W/HOLSTR 10FT

## (undated) DEVICE — CATH IV INSYTE AUTOGARD 14G 1 1/2IN ORNG

## (undated) DEVICE — CANNULA,ADULT,SOFT-TOUCH,7'TUBE,UC: Brand: PENDING

## (undated) DEVICE — LOU MINOR PROCEDURE: Brand: MEDLINE INDUSTRIES, INC.

## (undated) DEVICE — SOL ISO/ALC 70PCT 4OZ

## (undated) DEVICE — PATIENT RETURN ELECTRODE, SINGLE-USE, CONTACT QUALITY MONITORING, ADULT, WITH 9FT CORD, FOR PATIENTS WEIGING OVER 33LBS. (15KG): Brand: MEGADYNE

## (undated) DEVICE — PREP SOL POVIDONE/IODINE BT 4OZ

## (undated) DEVICE — SPNG LAP 18X18IN LF STRL PK/5

## (undated) DEVICE — GOWN,NON-REINFORCED,SIRUS,SET IN SLV,XXL: Brand: MEDLINE

## (undated) DEVICE — SPNG GZ WOVN 4X4IN 12PLY 10/BX STRL

## (undated) DEVICE — SUT ETHLN 4/0 PS2 PLSTC 1667G

## (undated) DEVICE — ADHS SKIN SURG TISS VISC PREMIERPRO EXOFIN HI/VISC FAST/DRY

## (undated) DEVICE — APPL CHLORAPREP HI/LITE 26ML ORNG

## (undated) DEVICE — GLV SURG SENSICARE PI LF PF 7.5 GRN STRL

## (undated) DEVICE — GOWN,SIRUS,NON REINFRCD,LARGE,SET IN SL: Brand: MEDLINE

## (undated) DEVICE — GLV SURG BIOGEL LTX PF 6 1/2

## (undated) DEVICE — GOWN SURG AERO CHROME XL

## (undated) DEVICE — DRAPE,U/ SHT,SPLIT,PLAS,STERIL: Brand: MEDLINE

## (undated) DEVICE — SKIN PREP TRAY W/CHG: Brand: MEDLINE INDUSTRIES, INC.

## (undated) DEVICE — TRAP FLD MINIVAC MEGADYNE 100ML

## (undated) DEVICE — ARM SLING: Brand: DEROYAL

## (undated) DEVICE — GLV SURG BIOGEL LTX PF 7

## (undated) DEVICE — TUBING, SUCTION, 1/4" X 10', STRAIGHT: Brand: MEDLINE

## (undated) DEVICE — DRAPE,SHOULDER,BEACH ULTRAGARD: Brand: MEDLINE

## (undated) DEVICE — DRSNG SURESITE WNDW 4X4.5

## (undated) DEVICE — GLV SURG SIGNATURE ESSENTIAL PF LTX SZ7

## (undated) DEVICE — DRAPE,UTILITY,TAPE,15X26,STERILE: Brand: MEDLINE

## (undated) DEVICE — PK SHLDR OPN 40

## (undated) DEVICE — NDL HYPO ECLPS SFTY 22G 1 1/2IN

## (undated) DEVICE — GLV SURG SIGNATURE ESSENTIAL PF LTX SZ8.5

## (undated) DEVICE — DRSNG TELFA PAD NONADH STR 1S 3X4IN

## (undated) DEVICE — SMOKE EVACUATION TUBING WITH 8 IN INTEGRAL WAND AND SPONGE GUARD: Brand: BUFFALO FILTER

## (undated) DEVICE — Device: Brand: DEFENDO AIR/WATER/SUCTION AND BIOPSY VALVE

## (undated) DEVICE — THE TORRENT IRRIGATION SCOPE CONNECTOR IS USED WITH THE TORRENT IRRIGATION TUBING TO PROVIDE IRRIGATION FLUIDS SUCH AS STERILE WATER DURING GASTROINTESTINAL ENDOSCOPIC PROCEDURES WHEN USED IN CONJUNCTION WITH AN IRRIGATION PUMP (OR ELECTROSURGICAL UNIT).: Brand: TORRENT

## (undated) DEVICE — ANTIBACTERIAL UNDYED BRAIDED (POLYGLACTIN 910), SYNTHETIC ABSORBABLE SUTURE: Brand: COATED VICRYL

## (undated) DEVICE — STERILE LATEX POWDER-FREE SURGICAL GLOVESWITH NITRILE COATING: Brand: PROTEXIS

## (undated) DEVICE — SINGLE-USE BIOPSY FORCEPS: Brand: RADIAL JAW 4

## (undated) DEVICE — GLV SURG BIOGEL LTX PF 8 1/2

## (undated) DEVICE — PANTY KNIT WASHABLE LG/XL BRN/GRN LF

## (undated) DEVICE — PRECISION THIN (9.0 X 0.38 X 25.0MM)

## (undated) DEVICE — TBG PENCL TELESCP MEGADYNE SMOKE EVAC 10FT